# Patient Record
Sex: MALE | Race: WHITE | NOT HISPANIC OR LATINO | Employment: OTHER | URBAN - METROPOLITAN AREA
[De-identification: names, ages, dates, MRNs, and addresses within clinical notes are randomized per-mention and may not be internally consistent; named-entity substitution may affect disease eponyms.]

---

## 2020-02-20 ENCOUNTER — NEW PATIENT COMPREHENSIVE (OUTPATIENT)
Dept: URBAN - METROPOLITAN AREA CLINIC 32 | Facility: CLINIC | Age: 75
End: 2020-02-20

## 2020-02-20 DIAGNOSIS — H25.013: ICD-10-CM

## 2020-02-20 PROCEDURE — 92004 COMPRE OPH EXAM NEW PT 1/>: CPT

## 2020-02-20 ASSESSMENT — VISUAL ACUITY
OD_CC: J1+
OD_CC: 20/30-1
OS_CC: J1
OS_SC: J1
OS_CC: 20/60
OD_SC: J1
OS_PH: 20/30-1

## 2020-02-20 ASSESSMENT — TONOMETRY
OD_IOP_MMHG: 18
OS_IOP_MMHG: 15

## 2020-02-20 ASSESSMENT — KERATOMETRY
OD_AXISANGLE2_DEGREES: 6
OD_AXISANGLE_DEGREES: 96
OS_AXISANGLE_DEGREES: 83
OS_K1POWER_DIOPTERS: 46.25
OS_AXISANGLE2_DEGREES: 173
OD_K2POWER_DIOPTERS: 42.75
OD_K1POWER_DIOPTERS: 44.5
OS_K2POWER_DIOPTERS: 44.75

## 2020-03-05 ENCOUNTER — SURGICAL TESTING (OUTPATIENT)
Dept: URBAN - METROPOLITAN AREA CLINIC 32 | Facility: CLINIC | Age: 75
End: 2020-03-05

## 2020-03-05 DIAGNOSIS — H25.043: ICD-10-CM

## 2020-03-05 DIAGNOSIS — H25.012: ICD-10-CM

## 2020-03-05 PROCEDURE — 92025 CPTRIZED CORNEAL TOPOGRAPHY: CPT

## 2020-03-05 PROCEDURE — 92136TC INTERFEROMETRY - TECHNICAL COMPONENT

## 2020-03-05 PROCEDURE — 92012 INTRM OPH EXAM EST PATIENT: CPT

## 2020-03-05 PROCEDURE — 92134 CPTRZ OPH DX IMG PST SGM RTA: CPT

## 2020-03-05 PROCEDURE — 92286 ANT SGM IMG I&R SPECLR MIC: CPT

## 2020-03-05 ASSESSMENT — KERATOMETRY
OS_AXISANGLE2_DEGREES: 173
OS_K1POWER_DIOPTERS: 46.25
OD_K2POWER_DIOPTERS: 42.75
OS_AXISANGLE_DEGREES: 83
OS_K2POWER_DIOPTERS: 44.75
OD_K1POWER_DIOPTERS: 44.5
OD_AXISANGLE_DEGREES: 96
OD_AXISANGLE2_DEGREES: 6

## 2020-03-05 ASSESSMENT — VISUAL ACUITY
OD_SC: J1
OD_CC: J1+
OS_SC: J1
OD_CC: 20/30
OS_CC: 20/40
OS_CC: J1+

## 2020-03-11 ENCOUNTER — SURGERY/PROCEDURE (OUTPATIENT)
Dept: URBAN - METROPOLITAN AREA CLINIC 32 | Facility: CLINIC | Age: 75
End: 2020-03-11

## 2020-03-11 DIAGNOSIS — H25.012: ICD-10-CM

## 2020-03-11 PROCEDURE — 66984AV REMOVE CATARACT, INSERT ADVANCED LENS

## 2020-03-12 ENCOUNTER — CATARACT POST-OP 1-DAY (OUTPATIENT)
Dept: URBAN - METROPOLITAN AREA CLINIC 32 | Facility: CLINIC | Age: 75
End: 2020-03-12

## 2020-03-12 DIAGNOSIS — Z96.1: ICD-10-CM

## 2020-03-12 DIAGNOSIS — H25.012: ICD-10-CM

## 2020-03-12 PROCEDURE — 99024 POSTOP FOLLOW-UP VISIT: CPT

## 2020-03-12 ASSESSMENT — KERATOMETRY
OS_AXISANGLE2_DEGREES: 173
OS_K2POWER_DIOPTERS: 44.75
OD_K2POWER_DIOPTERS: 42.75
OD_K1POWER_DIOPTERS: 44.5
OD_AXISANGLE2_DEGREES: 6
OS_K1POWER_DIOPTERS: 46.25
OS_AXISANGLE_DEGREES: 83
OD_AXISANGLE_DEGREES: 96

## 2020-03-12 ASSESSMENT — VISUAL ACUITY: OS_SC: 20/30+2

## 2020-03-12 ASSESSMENT — TONOMETRY: OS_IOP_MMHG: 26

## 2020-03-17 ENCOUNTER — POST-OP CATARACT (OUTPATIENT)
Dept: URBAN - METROPOLITAN AREA CLINIC 32 | Facility: CLINIC | Age: 75
End: 2020-03-17

## 2020-03-17 DIAGNOSIS — H25.011: ICD-10-CM

## 2020-03-17 PROCEDURE — 92012 INTRM OPH EXAM EST PATIENT: CPT

## 2020-03-17 ASSESSMENT — KERATOMETRY
OD_AXISANGLE_DEGREES: 96
OS_AXISANGLE2_DEGREES: 173
OS_K2POWER_DIOPTERS: 44.75
OS_AXISANGLE2_DEGREES: 5
OD_AXISANGLE2_DEGREES: 6
OD_K1POWER_DIOPTERS: 44.5
OS_AXISANGLE_DEGREES: 83
OS_K1POWER_DIOPTERS: 46.25
OS_K1POWER_DIOPTERS: 43.75
OD_K2POWER_DIOPTERS: 42.75
OS_AXISANGLE_DEGREES: 95
OS_K2POWER_DIOPTERS: 42.5

## 2020-03-17 ASSESSMENT — TONOMETRY: OS_IOP_MMHG: 20

## 2020-03-17 ASSESSMENT — VISUAL ACUITY: OS_SC: 20/25

## 2020-03-18 ASSESSMENT — KERATOMETRY
OD_K1POWER_DIOPTERS: 44.5
OS_K1POWER_DIOPTERS: 46.25
OS_AXISANGLE2_DEGREES: 173
OD_AXISANGLE2_DEGREES: 6
OD_K2POWER_DIOPTERS: 42.75
OD_AXISANGLE_DEGREES: 96
OS_AXISANGLE_DEGREES: 83
OS_K2POWER_DIOPTERS: 44.75

## 2020-05-13 ENCOUNTER — SURGERY/PROCEDURE (OUTPATIENT)
Dept: URBAN - METROPOLITAN AREA CLINIC 32 | Facility: CLINIC | Age: 75
End: 2020-05-13

## 2020-05-13 DIAGNOSIS — H25.011: ICD-10-CM

## 2020-05-13 DIAGNOSIS — H25.041: ICD-10-CM

## 2020-05-13 PROCEDURE — 66984AV REMOVE CATARACT, INSERT ADVANCED LENS

## 2020-05-14 ENCOUNTER — 1 DAY POST-OP (OUTPATIENT)
Dept: URBAN - METROPOLITAN AREA CLINIC 32 | Facility: CLINIC | Age: 75
End: 2020-05-14

## 2020-05-14 DIAGNOSIS — Z96.1: ICD-10-CM

## 2020-05-14 PROCEDURE — 99024 POSTOP FOLLOW-UP VISIT: CPT

## 2020-05-14 ASSESSMENT — KERATOMETRY
OS_AXISANGLE_DEGREES: 95
OD_AXISANGLE_DEGREES: 96
OD_AXISANGLE2_DEGREES: 6
OS_AXISANGLE2_DEGREES: 5
OS_K2POWER_DIOPTERS: 42.5
OS_AXISANGLE2_DEGREES: 173
OS_K2POWER_DIOPTERS: 44.75
OS_K1POWER_DIOPTERS: 43.75
OS_K1POWER_DIOPTERS: 46.25
OS_AXISANGLE_DEGREES: 83
OD_K2POWER_DIOPTERS: 42.75
OD_K1POWER_DIOPTERS: 44.5

## 2020-05-14 ASSESSMENT — VISUAL ACUITY
OD_SC: 20/60+1
OS_SC: 20/25

## 2020-05-14 ASSESSMENT — TONOMETRY: OD_IOP_MMHG: 14

## 2020-05-18 ASSESSMENT — KERATOMETRY
OS_K1POWER_DIOPTERS: 43.75
OD_AXISANGLE2_DEGREES: 6
OS_AXISANGLE2_DEGREES: 173
OS_K2POWER_DIOPTERS: 44.75
OD_K2POWER_DIOPTERS: 42.75
OS_AXISANGLE_DEGREES: 83
OS_K2POWER_DIOPTERS: 42.5
OS_K1POWER_DIOPTERS: 46.25
OD_K1POWER_DIOPTERS: 44.5
OS_AXISANGLE2_DEGREES: 5
OS_AXISANGLE_DEGREES: 95
OD_AXISANGLE_DEGREES: 96

## 2020-05-19 ENCOUNTER — POST-OP CATARACT (OUTPATIENT)
Dept: URBAN - METROPOLITAN AREA CLINIC 32 | Facility: CLINIC | Age: 75
End: 2020-05-19

## 2020-05-19 DIAGNOSIS — Z96.1: ICD-10-CM

## 2020-05-19 DIAGNOSIS — H25.012: ICD-10-CM

## 2020-05-19 PROCEDURE — 99024 POSTOP FOLLOW-UP VISIT: CPT

## 2020-05-19 ASSESSMENT — KERATOMETRY
OS_AXISANGLE_DEGREES: 83
OS_K2POWER_DIOPTERS: 42.5
OS_K1POWER_DIOPTERS: 43.75
OD_AXISANGLE2_DEGREES: 6
OD_K1POWER_DIOPTERS: 44.5
OD_K2POWER_DIOPTERS: 42.75
OD_AXISANGLE_DEGREES: 96
OS_AXISANGLE2_DEGREES: 173
OS_AXISANGLE_DEGREES: 95
OS_K1POWER_DIOPTERS: 46.25
OS_K2POWER_DIOPTERS: 44.75
OS_AXISANGLE2_DEGREES: 5

## 2020-05-19 ASSESSMENT — VISUAL ACUITY
OS_SC: 20/25
OD_SC: J2
OS_SC: J3
OD_SC: 20/40+1

## 2020-05-19 ASSESSMENT — TONOMETRY
OD_IOP_MMHG: 17
OS_IOP_MMHG: 17

## 2020-05-28 ENCOUNTER — POST-OP CATARACT (OUTPATIENT)
Dept: URBAN - METROPOLITAN AREA CLINIC 32 | Facility: CLINIC | Age: 75
End: 2020-05-28

## 2020-05-28 DIAGNOSIS — Z96.1: ICD-10-CM

## 2020-05-28 PROCEDURE — 99024 POSTOP FOLLOW-UP VISIT: CPT

## 2020-05-28 ASSESSMENT — KERATOMETRY
OS_AXISANGLE_DEGREES: 95
OS_AXISANGLE2_DEGREES: 173
OS_AXISANGLE_DEGREES: 83
OS_K2POWER_DIOPTERS: 44.25
OD_K1POWER_DIOPTERS: 42.75
OD_AXISANGLE2_DEGREES: 112
OS_AXISANGLE2_DEGREES: 85
OS_AXISANGLE2_DEGREES: 5
OD_K1POWER_DIOPTERS: 44.5
OS_K1POWER_DIOPTERS: 46.25
OD_AXISANGLE_DEGREES: 96
OS_K2POWER_DIOPTERS: 44.75
OD_K2POWER_DIOPTERS: 43.75
OD_AXISANGLE2_DEGREES: 6
OS_K1POWER_DIOPTERS: 43.75
OS_K2POWER_DIOPTERS: 42.5
OD_AXISANGLE_DEGREES: 22
OS_AXISANGLE_DEGREES: 175
OS_K1POWER_DIOPTERS: 42.75
OD_K2POWER_DIOPTERS: 42.75

## 2020-05-28 ASSESSMENT — VISUAL ACUITY
OD_SC: 20/30
OS_SC: 20/25

## 2020-05-28 ASSESSMENT — TONOMETRY
OD_IOP_MMHG: 15
OS_IOP_MMHG: 12

## 2021-01-21 ENCOUNTER — ESTABLISHED COMPREHENSIVE EXAM (OUTPATIENT)
Dept: URBAN - METROPOLITAN AREA CLINIC 32 | Facility: CLINIC | Age: 76
End: 2021-01-21

## 2021-01-21 DIAGNOSIS — H26.491: ICD-10-CM

## 2021-01-21 DIAGNOSIS — H43.813: ICD-10-CM

## 2021-01-21 PROCEDURE — 92134 CPTRZ OPH DX IMG PST SGM RTA: CPT

## 2021-01-21 PROCEDURE — 92014 COMPRE OPH EXAM EST PT 1/>: CPT

## 2021-01-21 ASSESSMENT — KERATOMETRY
OS_AXISANGLE2_DEGREES: 175
OD_AXISANGLE2_DEGREES: 8
OS_AXISANGLE_DEGREES: 85
OS_K2POWER_DIOPTERS: 43.00
OS_K1POWER_DIOPTERS: 45.00
OD_AXISANGLE_DEGREES: 98
OD_K1POWER_DIOPTERS: 44.25
OD_K2POWER_DIOPTERS: 42.50

## 2021-01-21 ASSESSMENT — TONOMETRY
OD_IOP_MMHG: 13
OS_IOP_MMHG: 13

## 2021-01-21 ASSESSMENT — VISUAL ACUITY
OD_SC: 20/30-1
OS_SC: 20/25-1
OD_SC: J2-1
OS_SC: J2-2

## 2022-01-27 ENCOUNTER — COMPREHENSIVE EXAM (OUTPATIENT)
Dept: URBAN - METROPOLITAN AREA CLINIC 32 | Facility: CLINIC | Age: 77
End: 2022-01-27

## 2022-01-27 DIAGNOSIS — H26.491: ICD-10-CM

## 2022-01-27 PROCEDURE — 92014 COMPRE OPH EXAM EST PT 1/>: CPT

## 2022-01-27 ASSESSMENT — KERATOMETRY
OS_AXISANGLE_DEGREES: 85
OS_K1POWER_DIOPTERS: 45.00
OS_K2POWER_DIOPTERS: 43.00
OS_AXISANGLE2_DEGREES: 175
OD_AXISANGLE2_DEGREES: 8
OD_AXISANGLE_DEGREES: 98
OD_K1POWER_DIOPTERS: 44.25
OD_K2POWER_DIOPTERS: 42.50

## 2022-01-27 ASSESSMENT — VISUAL ACUITY
OD_SC: 20/30-1
OS_SC: 20/20-2
OD_SC: J3
OS_SC: J3

## 2022-01-27 ASSESSMENT — TONOMETRY
OS_IOP_MMHG: 15
OD_IOP_MMHG: 14

## 2023-01-30 ENCOUNTER — COMPREHENSIVE EXAM (OUTPATIENT)
Dept: URBAN - METROPOLITAN AREA CLINIC 32 | Facility: CLINIC | Age: 78
End: 2023-01-30

## 2023-01-30 DIAGNOSIS — Z96.1: ICD-10-CM

## 2023-01-30 DIAGNOSIS — H43.813: ICD-10-CM

## 2023-01-30 DIAGNOSIS — H26.493: ICD-10-CM

## 2023-01-30 DIAGNOSIS — H25.012: ICD-10-CM

## 2023-01-30 DIAGNOSIS — H35.373: ICD-10-CM

## 2023-01-30 PROCEDURE — 92134 CPTRZ OPH DX IMG PST SGM RTA: CPT

## 2023-01-30 PROCEDURE — 92015 DETERMINE REFRACTIVE STATE: CPT

## 2023-01-30 PROCEDURE — 92014 COMPRE OPH EXAM EST PT 1/>: CPT

## 2023-01-30 ASSESSMENT — TONOMETRY
OD_IOP_MMHG: 13
OS_IOP_MMHG: 12

## 2023-01-30 ASSESSMENT — KERATOMETRY
OD_AXISANGLE2_DEGREES: 13
OD_K1POWER_DIOPTERS: 43.75
OD_AXISANGLE_DEGREES: 103
OD_K2POWER_DIOPTERS: 42.75
OS_AXISANGLE2_DEGREES: 179
OS_K1POWER_DIOPTERS: 44.00
OS_AXISANGLE_DEGREES: 89
OS_K2POWER_DIOPTERS: 42.75

## 2023-01-30 ASSESSMENT — VISUAL ACUITY
OS_SC: 20/20-2
OD_SC: J2
OS_SC: J2-1
OD_SC: 20/30+1

## 2023-08-25 ENCOUNTER — OFFICE VISIT (OUTPATIENT)
Dept: PRIMARY CARE | Facility: CLINIC | Age: 78
End: 2023-08-25
Payer: MEDICARE

## 2023-08-25 VITALS
BODY MASS INDEX: 27.78 KG/M2 | WEIGHT: 177 LBS | SYSTOLIC BLOOD PRESSURE: 136 MMHG | HEIGHT: 67 IN | DIASTOLIC BLOOD PRESSURE: 76 MMHG

## 2023-08-25 DIAGNOSIS — E78.00 PURE HYPERCHOLESTEROLEMIA: ICD-10-CM

## 2023-08-25 DIAGNOSIS — M10.9 GOUT, UNSPECIFIED CAUSE, UNSPECIFIED CHRONICITY, UNSPECIFIED SITE: ICD-10-CM

## 2023-08-25 DIAGNOSIS — N41.9 PROSTATITIS, UNSPECIFIED PROSTATITIS TYPE: ICD-10-CM

## 2023-08-25 DIAGNOSIS — N40.0 ENLARGED PROSTATE: ICD-10-CM

## 2023-08-25 DIAGNOSIS — I10 PRIMARY HYPERTENSION: ICD-10-CM

## 2023-08-25 PROBLEM — N28.1 ACQUIRED CYSTIC KIDNEY DISEASE: Status: ACTIVE | Noted: 2023-08-25

## 2023-08-25 PROBLEM — H43.813 POSTERIOR VITREOUS DETACHMENT OF BOTH EYES: Status: ACTIVE | Noted: 2023-08-25

## 2023-08-25 PROBLEM — H25.813 MIXED TYPE AGE-RELATED CATARACT, BOTH EYES: Status: ACTIVE | Noted: 2023-08-25

## 2023-08-25 PROBLEM — F41.9 ANXIETY: Status: ACTIVE | Noted: 2023-08-25

## 2023-08-25 PROBLEM — E53.8 VITAMIN B12 DEFICIENCY: Status: ACTIVE | Noted: 2023-08-25

## 2023-08-25 PROBLEM — M75.122 COMPLETE TEAR OF LEFT ROTATOR CUFF: Status: ACTIVE | Noted: 2023-08-25

## 2023-08-25 PROBLEM — R53.83 FATIGUE: Status: ACTIVE | Noted: 2023-08-25

## 2023-08-25 PROBLEM — R10.9 ABDOMINAL PAIN: Status: ACTIVE | Noted: 2023-08-25

## 2023-08-25 PROBLEM — M25.562 ACUTE PAIN OF LEFT KNEE: Status: ACTIVE | Noted: 2023-08-25

## 2023-08-25 PROBLEM — M25.559 PAIN IN JOINT INVOLVING PELVIC REGION AND THIGH: Status: ACTIVE | Noted: 2023-08-25

## 2023-08-25 PROBLEM — H26.9 CATARACT: Status: ACTIVE | Noted: 2023-08-25

## 2023-08-25 PROBLEM — M54.16 LUMBAR RADICULOPATHY: Status: ACTIVE | Noted: 2023-08-25

## 2023-08-25 PROBLEM — R73.03 BORDERLINE DIABETES: Status: ACTIVE | Noted: 2023-08-25

## 2023-08-25 PROBLEM — G43.109 OPHTHALMIC MIGRAINE: Status: ACTIVE | Noted: 2023-08-25

## 2023-08-25 PROBLEM — H43.399 VITREOUS FLOATERS: Status: ACTIVE | Noted: 2023-08-25

## 2023-08-25 PROBLEM — M19.039 OSTEOARTHRITIS OF WRIST: Status: ACTIVE | Noted: 2023-08-25

## 2023-08-25 PROBLEM — M18.0 ARTHRITIS OF CARPOMETACARPAL (CMC) JOINT OF BOTH THUMBS: Status: ACTIVE | Noted: 2023-08-25

## 2023-08-25 PROBLEM — H91.90 HARD OF HEARING: Status: ACTIVE | Noted: 2023-08-25

## 2023-08-25 PROBLEM — J32.9 SINUSITIS: Status: ACTIVE | Noted: 2023-08-25

## 2023-08-25 PROBLEM — R94.31 EKG, ABNORMAL: Status: ACTIVE | Noted: 2023-08-25

## 2023-08-25 PROBLEM — S63.602A LEFT THUMB SPRAIN: Status: ACTIVE | Noted: 2023-08-25

## 2023-08-25 PROBLEM — M54.50 LOW BACK PAIN: Status: ACTIVE | Noted: 2023-08-25

## 2023-08-25 PROBLEM — E55.9 VITAMIN D DEFICIENCY: Status: ACTIVE | Noted: 2023-08-25

## 2023-08-25 PROBLEM — M19.031 PRIMARY OSTEOARTHRITIS, RIGHT WRIST: Status: ACTIVE | Noted: 2023-08-25

## 2023-08-25 PROBLEM — R01.1 SYSTOLIC MURMUR: Status: ACTIVE | Noted: 2023-08-25

## 2023-08-25 PROBLEM — Z97.3 WEARS EYEGLASSES: Status: ACTIVE | Noted: 2023-08-25

## 2023-08-25 PROCEDURE — 1159F MED LIST DOCD IN RCRD: CPT | Performed by: INTERNAL MEDICINE

## 2023-08-25 PROCEDURE — 1125F AMNT PAIN NOTED PAIN PRSNT: CPT | Performed by: INTERNAL MEDICINE

## 2023-08-25 PROCEDURE — 3075F SYST BP GE 130 - 139MM HG: CPT | Performed by: INTERNAL MEDICINE

## 2023-08-25 PROCEDURE — 3078F DIAST BP <80 MM HG: CPT | Performed by: INTERNAL MEDICINE

## 2023-08-25 PROCEDURE — 99213 OFFICE O/P EST LOW 20 MIN: CPT | Performed by: INTERNAL MEDICINE

## 2023-08-25 RX ORDER — COLCHICINE 0.6 MG/1
0.6 TABLET ORAL DAILY
Qty: 90 TABLET | Refills: 0 | Status: SHIPPED | OUTPATIENT
Start: 2023-08-25 | End: 2023-12-25 | Stop reason: ALTCHOICE

## 2023-08-25 RX ORDER — NEBIVOLOL HYDROCHLORIDE 10 MG/1
TABLET ORAL
COMMUNITY
Start: 2013-06-28 | End: 2023-08-25 | Stop reason: SDUPTHER

## 2023-08-25 RX ORDER — ATORVASTATIN CALCIUM 10 MG/1
TABLET, FILM COATED ORAL
COMMUNITY
Start: 2013-06-28 | End: 2023-08-25 | Stop reason: SDUPTHER

## 2023-08-25 RX ORDER — ALLOPURINOL 300 MG/1
1 TABLET ORAL DAILY
COMMUNITY
Start: 2020-01-10 | End: 2023-08-25 | Stop reason: SDUPTHER

## 2023-08-25 RX ORDER — OLMESARTAN MEDOXOMIL AND HYDROCHLOROTHIAZIDE 40/25 40; 25 MG/1; MG/1
TABLET ORAL
COMMUNITY
Start: 2013-06-28 | End: 2023-08-25 | Stop reason: SDUPTHER

## 2023-08-25 RX ORDER — NEBIVOLOL HYDROCHLORIDE 10 MG/1
10 TABLET ORAL DAILY
Qty: 90 TABLET | Refills: 0 | Status: SHIPPED | OUTPATIENT
Start: 2023-08-25 | End: 2023-11-29 | Stop reason: SDUPTHER

## 2023-08-25 RX ORDER — OLMESARTAN MEDOXOMIL AND HYDROCHLOROTHIAZIDE 40/25 40; 25 MG/1; MG/1
1 TABLET ORAL DAILY
Qty: 90 TABLET | Refills: 0 | Status: SHIPPED | OUTPATIENT
Start: 2023-08-25 | End: 2023-11-29 | Stop reason: SDUPTHER

## 2023-08-25 RX ORDER — TAMSULOSIN HYDROCHLORIDE 0.4 MG/1
0.8 CAPSULE ORAL DAILY
COMMUNITY
End: 2023-08-25 | Stop reason: SDUPTHER

## 2023-08-25 RX ORDER — ATORVASTATIN CALCIUM 10 MG/1
10 TABLET, FILM COATED ORAL DAILY
Qty: 90 TABLET | Refills: 0 | Status: SHIPPED | OUTPATIENT
Start: 2023-08-25 | End: 2023-11-29 | Stop reason: SDUPTHER

## 2023-08-25 RX ORDER — ALLOPURINOL 300 MG/1
300 TABLET ORAL DAILY
Qty: 90 TABLET | Refills: 0 | Status: SHIPPED | OUTPATIENT
Start: 2023-08-25 | End: 2023-11-29 | Stop reason: SDUPTHER

## 2023-08-25 RX ORDER — TAMSULOSIN HYDROCHLORIDE 0.4 MG/1
0.8 CAPSULE ORAL DAILY
Qty: 180 CAPSULE | Refills: 0 | Status: SHIPPED | OUTPATIENT
Start: 2023-08-25 | End: 2023-11-29 | Stop reason: SDUPTHER

## 2023-08-25 ASSESSMENT — ENCOUNTER SYMPTOMS
OCCASIONAL FEELINGS OF UNSTEADINESS: 0
LOSS OF SENSATION IN FEET: 0
DEPRESSION: 0

## 2023-08-25 NOTE — PROGRESS NOTES
"OFFICE NOTE    NAME OF THE PATIENT: Geovany Stover    YOB: 1945    CHIEF COMPLAINT:  The patient today came here for follow-up on various conditions.  Overall, he is a happy person.  He is semi-retired.  Taking medications regularly with no side effects.  No chest pain. ______.    PAST MEDICAL HISTORY:  Reviewed on EMR, unchanged.    CURRENT MEDICATIONS:  Reviewed on EMR, unchanged.  List reviewed.    ALLERGIES:  Reviewed on EMR, unchanged.    SOCIAL HISTORY:  Reviewed on EMR, unchanged.    FAMILY HISTORY:  Reviewed on EMR, unchanged.    REVIEW OF SYSTEMS:  All 12 systems reviewed and pertaining covered in history and physical.    PHYSICAL EXAMINATION  VITAL SIGNS:  As recorded and reviewed from EMR.  RESPIRATORY:  The patient had normal inspirations and expirations.  The breath sounds were equal bilaterally and clear to auscultation.  CARDIOVASCULAR:  The patient had S1 normal, split S2 without obvious rubs, clicks, or murmurs.    GASTROINTESTINAL:  There was no hepatosplenomegaly.  There were no palpable masses and no inguinal nodes.  EXTREMITIES:  Legs had no edema.  NEUROLOGIC:  The patient had normal cranial nerves.  The reflexes, sensory, and motor examination were grossly within normal limits.    LAB WORK:  Laboratory testing discussed.    ASSESSMENT AND PLAN:  Hypertension, okay.  High cholesterol.  Monitor.  Prostate health.  Dr. Hatch handling it.  Gout.  I am going to check uric acid.  Complete blood work ordered.  I will see him in a week after test.  I also urged him to do Medicare Wellness Visit.      Kindly review this note in conjunction with EMR.     Subjective   Patient ID: Geovany Stover is a 77 y.o. male who presents for Follow-up.      HPI    Review of Systems    Objective   /76   Ht 1.702 m (5' 7\")   Wt 80.3 kg (177 lb)   BMI 27.72 kg/m²       Physical Exam    Assessment/Plan   Problem List Items Addressed This Visit       Enlarged prostate    Gout    Hypertension "    Prostatitis    Pure hypercholesterolemia

## 2023-11-18 ENCOUNTER — LAB (OUTPATIENT)
Dept: LAB | Facility: LAB | Age: 78
End: 2023-11-18
Payer: MEDICARE

## 2023-11-18 DIAGNOSIS — I10 PRIMARY HYPERTENSION: ICD-10-CM

## 2023-11-18 DIAGNOSIS — E78.00 PURE HYPERCHOLESTEROLEMIA: ICD-10-CM

## 2023-11-18 LAB
ALBUMIN SERPL BCP-MCNC: 4 G/DL (ref 3.4–5)
ALP SERPL-CCNC: 60 U/L (ref 33–136)
ALT SERPL W P-5'-P-CCNC: 14 U/L (ref 10–52)
ANION GAP SERPL CALC-SCNC: 12 MMOL/L (ref 10–20)
AST SERPL W P-5'-P-CCNC: 16 U/L (ref 9–39)
BASOPHILS # BLD AUTO: 0.04 X10*3/UL (ref 0–0.1)
BASOPHILS NFR BLD AUTO: 0.5 %
BILIRUB SERPL-MCNC: 0.7 MG/DL (ref 0–1.2)
BUN SERPL-MCNC: 32 MG/DL (ref 6–23)
CALCIUM SERPL-MCNC: 9.8 MG/DL (ref 8.6–10.6)
CHLORIDE SERPL-SCNC: 109 MMOL/L (ref 98–107)
CHOLEST SERPL-MCNC: 139 MG/DL (ref 0–199)
CHOLESTEROL/HDL RATIO: 3.6
CO2 SERPL-SCNC: 26 MMOL/L (ref 21–32)
CREAT SERPL-MCNC: 1.21 MG/DL (ref 0.5–1.3)
EOSINOPHIL # BLD AUTO: 0.12 X10*3/UL (ref 0–0.4)
EOSINOPHIL NFR BLD AUTO: 1.6 %
ERYTHROCYTE [DISTWIDTH] IN BLOOD BY AUTOMATED COUNT: 14.6 % (ref 11.5–14.5)
GFR SERPL CREATININE-BSD FRML MDRD: 62 ML/MIN/1.73M*2
GLUCOSE SERPL-MCNC: 114 MG/DL (ref 74–99)
HCT VFR BLD AUTO: 41.9 % (ref 41–52)
HDLC SERPL-MCNC: 39.1 MG/DL
HGB BLD-MCNC: 13.4 G/DL (ref 13.5–17.5)
IMM GRANULOCYTES # BLD AUTO: 0.05 X10*3/UL (ref 0–0.5)
IMM GRANULOCYTES NFR BLD AUTO: 0.7 % (ref 0–0.9)
LDLC SERPL CALC-MCNC: 73 MG/DL
LYMPHOCYTES # BLD AUTO: 1.11 X10*3/UL (ref 0.8–3)
LYMPHOCYTES NFR BLD AUTO: 15 %
MCH RBC QN AUTO: 30.2 PG (ref 26–34)
MCHC RBC AUTO-ENTMCNC: 32 G/DL (ref 32–36)
MCV RBC AUTO: 94 FL (ref 80–100)
MONOCYTES # BLD AUTO: 0.62 X10*3/UL (ref 0.05–0.8)
MONOCYTES NFR BLD AUTO: 8.4 %
NEUTROPHILS # BLD AUTO: 5.45 X10*3/UL (ref 1.6–5.5)
NEUTROPHILS NFR BLD AUTO: 73.8 %
NON HDL CHOLESTEROL: 100 MG/DL (ref 0–149)
NRBC BLD-RTO: 0 /100 WBCS (ref 0–0)
PLATELET # BLD AUTO: 230 X10*3/UL (ref 150–450)
POTASSIUM SERPL-SCNC: 3.9 MMOL/L (ref 3.5–5.3)
PROT SERPL-MCNC: 6.5 G/DL (ref 6.4–8.2)
RBC # BLD AUTO: 4.44 X10*6/UL (ref 4.5–5.9)
SODIUM SERPL-SCNC: 143 MMOL/L (ref 136–145)
TRIGL SERPL-MCNC: 135 MG/DL (ref 0–149)
TSH SERPL-ACNC: 1.78 MIU/L (ref 0.44–3.98)
VLDL: 27 MG/DL (ref 0–40)
WBC # BLD AUTO: 7.4 X10*3/UL (ref 4.4–11.3)

## 2023-11-18 PROCEDURE — 80061 LIPID PANEL: CPT

## 2023-11-18 PROCEDURE — 85025 COMPLETE CBC W/AUTO DIFF WBC: CPT

## 2023-11-18 PROCEDURE — 84443 ASSAY THYROID STIM HORMONE: CPT

## 2023-11-18 PROCEDURE — 80053 COMPREHEN METABOLIC PANEL: CPT

## 2023-11-18 PROCEDURE — 36415 COLL VENOUS BLD VENIPUNCTURE: CPT

## 2023-11-29 ENCOUNTER — OFFICE VISIT (OUTPATIENT)
Dept: PRIMARY CARE | Facility: CLINIC | Age: 78
End: 2023-11-29
Payer: MEDICARE

## 2023-11-29 ENCOUNTER — LAB (OUTPATIENT)
Dept: LAB | Facility: LAB | Age: 78
End: 2023-11-29
Payer: MEDICARE

## 2023-11-29 VITALS
BODY MASS INDEX: 28.88 KG/M2 | DIASTOLIC BLOOD PRESSURE: 72 MMHG | HEIGHT: 67 IN | WEIGHT: 184 LBS | SYSTOLIC BLOOD PRESSURE: 126 MMHG

## 2023-11-29 DIAGNOSIS — Z00.00 MEDICARE ANNUAL WELLNESS VISIT, INITIAL: Primary | ICD-10-CM

## 2023-11-29 DIAGNOSIS — N41.9 PROSTATITIS, UNSPECIFIED PROSTATITIS TYPE: ICD-10-CM

## 2023-11-29 DIAGNOSIS — I10 PRIMARY HYPERTENSION: ICD-10-CM

## 2023-11-29 DIAGNOSIS — Z13.29 THYROID DISORDER SCREENING: ICD-10-CM

## 2023-11-29 DIAGNOSIS — E78.00 PURE HYPERCHOLESTEROLEMIA: ICD-10-CM

## 2023-11-29 DIAGNOSIS — Z23 NEED FOR INFLUENZA VACCINATION: ICD-10-CM

## 2023-11-29 DIAGNOSIS — D64.9 ANEMIA, UNSPECIFIED TYPE: ICD-10-CM

## 2023-11-29 DIAGNOSIS — M10.9 GOUT, UNSPECIFIED CAUSE, UNSPECIFIED CHRONICITY, UNSPECIFIED SITE: ICD-10-CM

## 2023-11-29 LAB
BASOPHILS # BLD AUTO: 0.04 X10*3/UL (ref 0–0.1)
BASOPHILS NFR BLD AUTO: 0.6 %
EOSINOPHIL # BLD AUTO: 0.12 X10*3/UL (ref 0–0.4)
EOSINOPHIL NFR BLD AUTO: 1.7 %
ERYTHROCYTE [DISTWIDTH] IN BLOOD BY AUTOMATED COUNT: 14.4 % (ref 11.5–14.5)
HCT VFR BLD AUTO: 42 % (ref 41–52)
HGB BLD-MCNC: 13.8 G/DL (ref 13.5–17.5)
IMM GRANULOCYTES # BLD AUTO: 0.03 X10*3/UL (ref 0–0.5)
IMM GRANULOCYTES NFR BLD AUTO: 0.4 % (ref 0–0.9)
IRON SATN MFR SERPL: 21 % (ref 25–45)
IRON SERPL-MCNC: 72 UG/DL (ref 35–150)
LYMPHOCYTES # BLD AUTO: 0.86 X10*3/UL (ref 0.8–3)
LYMPHOCYTES NFR BLD AUTO: 12 %
MCH RBC QN AUTO: 30.3 PG (ref 26–34)
MCHC RBC AUTO-ENTMCNC: 32.9 G/DL (ref 32–36)
MCV RBC AUTO: 92 FL (ref 80–100)
MONOCYTES # BLD AUTO: 0.57 X10*3/UL (ref 0.05–0.8)
MONOCYTES NFR BLD AUTO: 8 %
NEUTROPHILS # BLD AUTO: 5.53 X10*3/UL (ref 1.6–5.5)
NEUTROPHILS NFR BLD AUTO: 77.3 %
NRBC BLD-RTO: 0 /100 WBCS (ref 0–0)
PLATELET # BLD AUTO: 204 X10*3/UL (ref 150–450)
RBC # BLD AUTO: 4.55 X10*6/UL (ref 4.5–5.9)
TIBC SERPL-MCNC: 336 UG/DL (ref 240–445)
UIBC SERPL-MCNC: 264 UG/DL (ref 110–370)
URATE SERPL-MCNC: 5.2 MG/DL (ref 4–7.5)
WBC # BLD AUTO: 7.2 X10*3/UL (ref 4.4–11.3)

## 2023-11-29 PROCEDURE — 3074F SYST BP LT 130 MM HG: CPT | Performed by: INTERNAL MEDICINE

## 2023-11-29 PROCEDURE — 1125F AMNT PAIN NOTED PAIN PRSNT: CPT | Performed by: INTERNAL MEDICINE

## 2023-11-29 PROCEDURE — 3078F DIAST BP <80 MM HG: CPT | Performed by: INTERNAL MEDICINE

## 2023-11-29 PROCEDURE — 85025 COMPLETE CBC W/AUTO DIFF WBC: CPT

## 2023-11-29 PROCEDURE — 90662 IIV NO PRSV INCREASED AG IM: CPT | Performed by: INTERNAL MEDICINE

## 2023-11-29 PROCEDURE — 99214 OFFICE O/P EST MOD 30 MIN: CPT | Performed by: INTERNAL MEDICINE

## 2023-11-29 PROCEDURE — 36415 COLL VENOUS BLD VENIPUNCTURE: CPT

## 2023-11-29 PROCEDURE — 83550 IRON BINDING TEST: CPT

## 2023-11-29 PROCEDURE — G0008 ADMIN INFLUENZA VIRUS VAC: HCPCS | Performed by: INTERNAL MEDICINE

## 2023-11-29 PROCEDURE — 83540 ASSAY OF IRON: CPT

## 2023-11-29 PROCEDURE — 1157F ADVNC CARE PLAN IN RCRD: CPT | Performed by: INTERNAL MEDICINE

## 2023-11-29 PROCEDURE — 84550 ASSAY OF BLOOD/URIC ACID: CPT

## 2023-11-29 PROCEDURE — 1160F RVW MEDS BY RX/DR IN RCRD: CPT | Performed by: INTERNAL MEDICINE

## 2023-11-29 PROCEDURE — 1170F FXNL STATUS ASSESSED: CPT | Performed by: INTERNAL MEDICINE

## 2023-11-29 PROCEDURE — 1159F MED LIST DOCD IN RCRD: CPT | Performed by: INTERNAL MEDICINE

## 2023-11-29 RX ORDER — OLMESARTAN MEDOXOMIL AND HYDROCHLOROTHIAZIDE 40/25 40; 25 MG/1; MG/1
1 TABLET ORAL DAILY
Qty: 90 TABLET | Refills: 1 | Status: SHIPPED | OUTPATIENT
Start: 2023-11-29 | End: 2024-05-27

## 2023-11-29 RX ORDER — TAMSULOSIN HYDROCHLORIDE 0.4 MG/1
0.8 CAPSULE ORAL DAILY
Qty: 180 CAPSULE | Refills: 1 | Status: SHIPPED | OUTPATIENT
Start: 2023-11-29

## 2023-11-29 RX ORDER — NEBIVOLOL HYDROCHLORIDE 10 MG/1
10 TABLET ORAL DAILY
Qty: 90 TABLET | Refills: 1 | Status: SHIPPED | OUTPATIENT
Start: 2023-11-29 | End: 2023-12-01

## 2023-11-29 RX ORDER — ALLOPURINOL 300 MG/1
300 TABLET ORAL DAILY
Qty: 90 TABLET | Refills: 1 | Status: SHIPPED | OUTPATIENT
Start: 2023-11-29

## 2023-11-29 RX ORDER — ATORVASTATIN CALCIUM 10 MG/1
10 TABLET, FILM COATED ORAL DAILY
Qty: 90 TABLET | Refills: 1 | Status: SHIPPED | OUTPATIENT
Start: 2023-11-29

## 2023-11-29 ASSESSMENT — PATIENT HEALTH QUESTIONNAIRE - PHQ9
2. FEELING DOWN, DEPRESSED OR HOPELESS: NOT AT ALL
SUM OF ALL RESPONSES TO PHQ9 QUESTIONS 1 AND 2: 0
1. LITTLE INTEREST OR PLEASURE IN DOING THINGS: NOT AT ALL

## 2023-11-29 ASSESSMENT — ACTIVITIES OF DAILY LIVING (ADL)
GROCERY_SHOPPING: INDEPENDENT
TAKING_MEDICATION: INDEPENDENT
DOING_HOUSEWORK: INDEPENDENT
MANAGING_FINANCES: INDEPENDENT
BATHING: INDEPENDENT
DRESSING: INDEPENDENT

## 2023-11-29 ASSESSMENT — ENCOUNTER SYMPTOMS
DEPRESSION: 0
LOSS OF SENSATION IN FEET: 0
OCCASIONAL FEELINGS OF UNSTEADINESS: 0

## 2023-11-29 NOTE — PROGRESS NOTES
"Subjective   Patient ID: Geovany Stover is a 78 y.o. male who presents for Follow-up (multiple medical issues.).    Mr. Stover today came here for multiple medical issues.  1. Medicare Wellness visit.  I thanked him for doing it for me.  2. Follow-up on blood work.  Appetite and weight are okay.  No problem.  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  He is going to the [place].    IMMUNIZATION:  The patient is getting flu shot today.  Regular with pneumonia and shingles shots.    HEALTH MAINTENANCE: Colonoscopy several years ago.  He is really not keen on it at the moment.    I have personally reviewed the patient's Past Medical History, Medications, Allergies, Social History, and Family History in the EMR.    Review of Systems   All other systems reviewed and are negative.  The patient never had a stroke.  No heart attack.  No diabetes.  No cancer.    Objective   /72   Ht 1.702 m (5' 7\")   Wt 83.5 kg (184 lb)   BMI 28.82 kg/m²     Physical Exam  Vitals reviewed.   HENT:      Right Ear: Tympanic membrane, ear canal and external ear normal.      Left Ear: Tympanic membrane, ear canal and external ear normal.   Eyes:      General: No scleral icterus.     Pupils: Pupils are equal, round, and reactive to light.   Neck:      Vascular: No carotid bruit.   Cardiovascular:      Heart sounds: Normal heart sounds, S1 normal and S2 normal. No murmur heard.     No friction rub.   Pulmonary:      Effort: Pulmonary effort is normal.      Breath sounds: Normal breath sounds and air entry.   Abdominal:      Palpations: There is no hepatomegaly, splenomegaly or mass.   Genitourinary:     Comments: RECTAL: Deferred by the patient.  GENITAL: Deferred by the patient.  Musculoskeletal:         General: No swelling or deformity. Normal range of motion.      Cervical back: Neck supple.      Right lower leg: No edema.      Left lower leg: No edema.   Lymphadenopathy:      Cervical: No cervical adenopathy.      Upper Body:      Right upper body: No " axillary adenopathy.      Left upper body: No axillary adenopathy.      Lower Body: No right inguinal adenopathy. No left inguinal adenopathy.   Neurological:      Mental Status: He is oriented to person, place, and time.      Cranial Nerves: Cranial nerves 2-12 are intact. No cranial nerve deficit.      Sensory: No sensory deficit.      Motor: Motor function is intact. No weakness.      Gait: Gait is intact.      Deep Tendon Reflexes: Reflexes normal.   Psychiatric:         Mood and Affect: Mood normal. Mood is not anxious or depressed. Affect is not angry.         Behavior: Behavior is not agitated.         Thought Content: Thought content normal.         Judgment: Judgment normal.     LAB WORK: Laboratory testing discussed.    Assessment/Plan   Problem List Items Addressed This Visit             ICD-10-CM       Cardiac and Vasculature    Hypertension I10    Pure hypercholesterolemia E78.00       Genitourinary and Reproductive    Prostatitis N41.9       Musculoskeletal and Injuries    Gout M10.9    Relevant Orders    Uric Acid     Other Visit Diagnoses         Codes    Medicare annual wellness visit, initial    -  Primary Z00.00    Need for influenza vaccination     Z23    Relevant Orders    Flu vaccine, quadrivalent, high-dose, preservative free, age 65y+ (FLUZONE) (Completed)    Anemia, unspecified type     D64.9    Relevant Orders    CBC and Auto Differential    Iron and TIBC    Thyroid disorder screening     Z13.29        1. Medicare Wellness visit, done.  2. Okay with vision and hearing.  No problem.  Regular with dental checkup.  3. The patient is full code.  In case of emergency, wife is first and Dewayne, his son, are legal power of .  The patient is not depressed, not suicidal.  4. Hypertension, okay.  5. Cholesterol, stable.  6. Thyroid, okay.  7. Anemia.  First time, I found his hemoglobin is borderline low.  I am going to order anemia checkup right away and I will follow up on it.  8. If  necessary, I will see him right away.  Otherwise, I will see him on a beautiful day of spring when he comes back from [place].  9. Flu shot on its way.    Melodyibvineet Attestation  By signing my name below, I, Kimani Terry attest that this documentation has been prepared under the direction and in the presence of Hao Meng MD.

## 2023-12-01 DIAGNOSIS — I10 PRIMARY HYPERTENSION: ICD-10-CM

## 2023-12-01 RX ORDER — NEBIVOLOL 10 MG/1
10 TABLET ORAL DAILY
Qty: 90 TABLET | Refills: 1 | Status: SHIPPED | OUTPATIENT
Start: 2023-12-01 | End: 2023-12-02 | Stop reason: SDUPTHER

## 2023-12-02 DIAGNOSIS — I10 PRIMARY HYPERTENSION: ICD-10-CM

## 2023-12-04 RX ORDER — NEBIVOLOL 10 MG/1
10 TABLET ORAL DAILY
Qty: 90 TABLET | Refills: 1 | Status: SHIPPED | OUTPATIENT
Start: 2023-12-04

## 2023-12-11 ENCOUNTER — OFFICE VISIT (OUTPATIENT)
Dept: ORTHOPEDIC SURGERY | Facility: CLINIC | Age: 78
End: 2023-12-11
Payer: MEDICARE

## 2023-12-11 DIAGNOSIS — M18.11 ARTHRITIS OF CARPOMETACARPAL (CMC) JOINT OF RIGHT THUMB: Primary | ICD-10-CM

## 2023-12-11 DIAGNOSIS — M18.12 ARTHRITIS OF CARPOMETACARPAL (CMC) JOINT OF LEFT THUMB: ICD-10-CM

## 2023-12-11 PROCEDURE — 20606 DRAIN/INJ JOINT/BURSA W/US: CPT | Performed by: ORTHOPAEDIC SURGERY

## 2023-12-11 PROCEDURE — 99213 OFFICE O/P EST LOW 20 MIN: CPT | Performed by: ORTHOPAEDIC SURGERY

## 2023-12-11 PROCEDURE — 1125F AMNT PAIN NOTED PAIN PRSNT: CPT | Performed by: ORTHOPAEDIC SURGERY

## 2023-12-11 PROCEDURE — 1159F MED LIST DOCD IN RCRD: CPT | Performed by: ORTHOPAEDIC SURGERY

## 2023-12-11 PROCEDURE — 1160F RVW MEDS BY RX/DR IN RCRD: CPT | Performed by: ORTHOPAEDIC SURGERY

## 2023-12-11 PROCEDURE — 1036F TOBACCO NON-USER: CPT | Performed by: ORTHOPAEDIC SURGERY

## 2023-12-11 RX ORDER — METHYLPREDNISOLONE ACETATE 40 MG/ML
20 INJECTION, SUSPENSION INTRA-ARTICULAR; INTRALESIONAL; INTRAMUSCULAR; SOFT TISSUE
Status: COMPLETED | OUTPATIENT
Start: 2023-12-11 | End: 2023-12-11

## 2023-12-11 RX ORDER — LIDOCAINE HYDROCHLORIDE 10 MG/ML
0.5 INJECTION INFILTRATION; PERINEURAL
Status: COMPLETED | OUTPATIENT
Start: 2023-12-11 | End: 2023-12-11

## 2023-12-11 RX ADMIN — LIDOCAINE HYDROCHLORIDE 0.5 ML: 10 INJECTION INFILTRATION; PERINEURAL at 08:19

## 2023-12-11 RX ADMIN — METHYLPREDNISOLONE ACETATE 20 MG: 40 INJECTION, SUSPENSION INTRA-ARTICULAR; INTRALESIONAL; INTRAMUSCULAR; SOFT TISSUE at 08:19

## 2023-12-11 ASSESSMENT — PAIN SCALES - GENERAL: PAINLEVEL_OUTOF10: 2

## 2023-12-11 ASSESSMENT — ENCOUNTER SYMPTOMS
FEVER: 0
ARTHRALGIAS: 1
FATIGUE: 0
SHORTNESS OF BREATH: 0
WHEEZING: 0
CHILLS: 0

## 2023-12-11 ASSESSMENT — PAIN - FUNCTIONAL ASSESSMENT: PAIN_FUNCTIONAL_ASSESSMENT: 0-10

## 2023-12-11 NOTE — PROGRESS NOTES
Reason for Appointment  Chief Complaint   Patient presents with    Left Hand - Injections    Right Hand - Injections     History of Present Illness  Patient is a 78 y.o. male here today for follow-up evaluation of bilateral thumb pain almost one year s/p bilateral CMC injections. These injections provided good relief, but over the past month he has noticed some aching returning. Pain is worse with pinching and gripping. He does also have occasional numbness in the right hand. He is requesting repeat injections today. No other updates to PMH, allergies, or medications.     Past Medical History:   Diagnosis Date    Arthritis     Gout     High cholesterol     Hypertension     Personal history of other diseases of the nervous system and sense organs     History of cataract    Personal history of other endocrine, nutritional and metabolic disease 06/21/2013    History of hypercalcemia       History reviewed. No pertinent surgical history.    Medication Documentation Review Audit       Reviewed by Hao Meng MD (Physician) on 11/29/23 at 0958      Medication Order Taking? Sig Documenting Provider Last Dose Status   allopurinol (Zyloprim) 300 mg tablet [id number]  Take 1 tablet (300 mg) by mouth once daily. Hao Meng MD  Active   atorvastatin (Lipitor) 10 mg tablet [id number]  Take 1 tablet (10 mg) by mouth once daily. Hao Meng MD  Active   Bystolic 10 mg tablet [id number]  Take 1 tablet (10 mg) by mouth once daily. Hao Meng MD  Active   colchicine 0.6 mg tablet [id number]  Take 1 tablet (0.6 mg) by mouth once daily. Hao Meng MD  Active   olmesartan-hydrochlorothiazide (BENIcar HCT) 40-25 mg tablet [id number]  Take 1 tablet by mouth once daily. Hao Meng MD  Active   tamsulosin (Flomax) 0.4 mg 24 hr capsule [id number]  Take 2 capsules (0.8 mg) by mouth once daily. Hao Meng MD  Active                    No Known Allergies    Review of Systems   Constitutional:  Negative for  chills, fatigue and fever.   Respiratory:  Negative for shortness of breath and wheezing.    Cardiovascular:  Negative for chest pain and leg swelling.   Musculoskeletal:  Positive for arthralgias.   All other systems reviewed and are negative.    Exam   On exam of bilateral hands, there significant CMC arthritis in both thumbs with swelling, positive CMC grind test, MP joints are stable, there is distal arthritic change, good pulses, good sensation, no atrophy, negative Tinel's median nerves, no triggering    Assessment   Encounter Diagnoses   Name Primary?    Arthritis of carpometacarpal (CMC) joint of right thumb Yes    Arthritis of carpometacarpal (CMC) joint of left thumb      Plan   Today we discussed conservative treatment. At this point, the patient is experiencing pain at the base of the bilateral thumb that is consistent with classic CMC joint osteoarthritis on clinical examination and X-ray images. We will do one injection into the side: bilateral thumb CMC joint in hopes to calm their symptoms nicely. Patient will follow-up in 4 weeks, if needed. Pt understands the small risk of infection and warning signs including flare reaction.    Patient ID: Geovany Stover is a 78 y.o. male.    M Inj/Asp: R radiocarpal on 12/11/2023 8:19 AM  Indications: pain  Details: ultrasound-guided  Medications: 0.5 mL lidocaine 10 mg/mL (1 %); 20 mg methylPREDNISolone acetate 40 mg/mL  Outcome: tolerated well, no immediate complications    After discussing the risks and benefits of the procedure we proceeded with the injection. Using ultrasound guidance we obtained images of the thumb CMC joint and subsequently we sterilely injected a mixture of 20 mg of DepoMedrol and .5 cc of 1% lidocaine into the right CMC joint. Pt tolerated the procedure well without any adverse effects.    Procedure, treatment alternatives, risks and benefits explained, specific risks discussed. Consent was given by the patient. Immediately prior to  procedure a time out was called to verify the correct patient, procedure, equipment, support staff and site/side marked as required. Patient was prepped and draped in the usual sterile fashion.       M Inj/Asp: L radiocarpal on 12/11/2023 8:19 AM  Indications: pain  Details: ultrasound-guided  Medications: 0.5 mL lidocaine 10 mg/mL (1 %); 20 mg methylPREDNISolone acetate 40 mg/mL  Outcome: tolerated well, no immediate complications    After discussing the risks and benefits of the procedure we proceeded with the injection. Using ultrasound guidance we obtained images of the thumb CMC joint and subsequently we sterilely injected a mixture of 20 mg of DepoMedrol and .5 cc of 1% lidocaine into the left CMC joint. Pt tolerated the procedure well without any adverse effects.    Procedure, treatment alternatives, risks and benefits explained, specific risks discussed. Consent was given by the patient. Immediately prior to procedure a time out was called to verify the correct patient, procedure, equipment, support staff and site/side marked as required. Patient was prepped and draped in the usual sterile fashion.       I, Shanae Bonner, attest that this documentation has been prepared under the direction and in the presence of Tc Arvizu MD. By signing below, ITc MD, personally performed the services described in this documentation. All medical record entries made by the scribe were at my direction and in my presence. I have reviewed the chart and agree that the record reflects my personal performance and is accurate and complete. 12/11/23

## 2023-12-25 ENCOUNTER — TELEMEDICINE (OUTPATIENT)
Dept: PRIMARY CARE | Facility: CLINIC | Age: 78
End: 2023-12-25
Payer: MEDICARE

## 2023-12-25 DIAGNOSIS — U07.1 COVID-19: Primary | ICD-10-CM

## 2023-12-25 PROCEDURE — 99213 OFFICE O/P EST LOW 20 MIN: CPT | Performed by: NURSE PRACTITIONER

## 2023-12-25 RX ORDER — NIRMATRELVIR AND RITONAVIR 300-100 MG
3 KIT ORAL 2 TIMES DAILY
Qty: 30 TABLET | Refills: 0 | Status: SHIPPED | OUTPATIENT
Start: 2023-12-25 | End: 2024-05-09 | Stop reason: WASHOUT

## 2023-12-25 ASSESSMENT — ENCOUNTER SYMPTOMS
DIAPHORESIS: 0
FEVER: 0
NAUSEA: 0
CHILLS: 0
RHINORRHEA: 1
DIZZINESS: 0
VOMITING: 0
COUGH: 1
HEADACHES: 1
SORE THROAT: 1
LIGHT-HEADEDNESS: 0
ACTIVITY CHANGE: 0
WHEEZING: 0
DIARRHEA: 0
SHORTNESS OF BREATH: 0

## 2023-12-25 NOTE — PROGRESS NOTES
Subjective   Patient ID: Geovany Stover is a 78 y.o. male who presents for Covid 19.    Tested +:12/25/23  Sx  started: 12/23/23:  Sx include: sore throat, cough, runny nose, HA  Denies fever  Vaccine Status: yes   Previous infection: yes         Review of Systems   Constitutional:  Negative for activity change, chills, diaphoresis and fever.   HENT:  Positive for congestion, rhinorrhea and sore throat.    Respiratory:  Positive for cough. Negative for shortness of breath and wheezing.    Cardiovascular:  Negative for chest pain.   Gastrointestinal:  Negative for diarrhea, nausea and vomiting.   Neurological:  Positive for headaches (mild). Negative for dizziness and light-headedness.       Objective   There were no vitals taken for this visit.    Physical Exam  Constitutional:       General: He is not in acute distress.     Appearance: Normal appearance. He is not ill-appearing.      Comments: Pt location in [place] and consent obtained.   Telemedicine appropriate evaluation completed.  Unable to perform complete physical exam due to virtual visit, patient was visualized on interactive video.      Pulmonary:      Effort: Pulmonary effort is normal.   Neurological:      Mental Status: He is alert and oriented to person, place, and time.         Assessment/Plan   Diagnoses and all orders for this visit:  COVID-19  -     nirmatrelvir-ritonavir (Paxlovid) 300 mg (150 mg x 2)-100 mg tablet therapy pack; Take 3 tablets by mouth 2 times a day. TAKE AS DIRECTED (300mg nirmatrelvir/100mg ritonavir) TWICE DAILY  Advised to hold Tamsulosin and Atorvastatin while on Paxlovid: if symptoms are improved by tomorrow recommend not taking Paxlovid.   Prescribed Paxlovid, Use OTC Tylenol as needed for pain/fever and Mucinex or Robitussin as needed for cough/congestion  Follow CDC guidelines for quarantine and masking.   seek emergency care if having CP or difficulty breathing.  Follow up as needed

## 2023-12-28 ENCOUNTER — APPOINTMENT (OUTPATIENT)
Dept: CARDIOLOGY | Facility: CLINIC | Age: 78
End: 2023-12-28
Payer: MEDICARE

## 2024-01-31 ENCOUNTER — COMPREHENSIVE EXAM (OUTPATIENT)
Dept: URBAN - METROPOLITAN AREA CLINIC 32 | Facility: CLINIC | Age: 79
End: 2024-01-31

## 2024-01-31 DIAGNOSIS — H43.813: ICD-10-CM

## 2024-01-31 DIAGNOSIS — H26.493: ICD-10-CM

## 2024-01-31 DIAGNOSIS — H35.373: ICD-10-CM

## 2024-01-31 PROCEDURE — 99214 OFFICE O/P EST MOD 30 MIN: CPT

## 2024-01-31 PROCEDURE — 92015 DETERMINE REFRACTIVE STATE: CPT

## 2024-01-31 PROCEDURE — 92134 CPTRZ OPH DX IMG PST SGM RTA: CPT

## 2024-01-31 ASSESSMENT — KERATOMETRY
OS_K1POWER_DIOPTERS: 44.00
OD_AXISANGLE_DEGREES: 103
OD_K1POWER_DIOPTERS: 43.75
OS_AXISANGLE2_DEGREES: 179
OS_AXISANGLE_DEGREES: 89
OD_K2POWER_DIOPTERS: 42.75
OS_K2POWER_DIOPTERS: 42.75
OD_AXISANGLE2_DEGREES: 13

## 2024-01-31 ASSESSMENT — TONOMETRY
OD_IOP_MMHG: 13
OS_IOP_MMHG: 14

## 2024-01-31 ASSESSMENT — VISUAL ACUITY
OS_SC: 20/30+2
OD_GLARE: 20/50
OD_SC: J2
OD_SC: 20/40
OS_GLARE: 20/40
OS_SC: J2

## 2024-05-09 ENCOUNTER — OFFICE VISIT (OUTPATIENT)
Dept: CARDIOLOGY | Facility: CLINIC | Age: 79
End: 2024-05-09
Payer: MEDICARE

## 2024-05-09 VITALS
BODY MASS INDEX: 29.74 KG/M2 | SYSTOLIC BLOOD PRESSURE: 131 MMHG | HEIGHT: 67 IN | WEIGHT: 189.5 LBS | RESPIRATION RATE: 16 BRPM | DIASTOLIC BLOOD PRESSURE: 76 MMHG | OXYGEN SATURATION: 97 % | HEART RATE: 66 BPM

## 2024-05-09 DIAGNOSIS — I10 HYPERTENSION, UNSPECIFIED TYPE: Primary | ICD-10-CM

## 2024-05-09 PROCEDURE — 3075F SYST BP GE 130 - 139MM HG: CPT | Performed by: NURSE PRACTITIONER

## 2024-05-09 PROCEDURE — 1159F MED LIST DOCD IN RCRD: CPT | Performed by: NURSE PRACTITIONER

## 2024-05-09 PROCEDURE — 99214 OFFICE O/P EST MOD 30 MIN: CPT | Performed by: NURSE PRACTITIONER

## 2024-05-09 PROCEDURE — 1036F TOBACCO NON-USER: CPT | Performed by: NURSE PRACTITIONER

## 2024-05-09 PROCEDURE — 1157F ADVNC CARE PLAN IN RCRD: CPT | Performed by: NURSE PRACTITIONER

## 2024-05-09 PROCEDURE — 1160F RVW MEDS BY RX/DR IN RCRD: CPT | Performed by: NURSE PRACTITIONER

## 2024-05-09 PROCEDURE — 3078F DIAST BP <80 MM HG: CPT | Performed by: NURSE PRACTITIONER

## 2024-05-09 RX ORDER — SILDENAFIL 100 MG/1
100 TABLET, FILM COATED ORAL
COMMUNITY
Start: 2024-01-05 | End: 2025-01-04

## 2024-05-09 RX ORDER — FINASTERIDE 5 MG/1
5 TABLET, FILM COATED ORAL DAILY
COMMUNITY
Start: 2024-01-05

## 2024-05-09 ASSESSMENT — ENCOUNTER SYMPTOMS
GASTROINTESTINAL NEGATIVE: 1
DEPRESSION: 0
RESPIRATORY NEGATIVE: 1
CARDIOVASCULAR NEGATIVE: 1
NEUROLOGICAL NEGATIVE: 1
MUSCULOSKELETAL NEGATIVE: 1
CONSTITUTIONAL NEGATIVE: 1

## 2024-05-09 ASSESSMENT — PATIENT HEALTH QUESTIONNAIRE - PHQ9
1. LITTLE INTEREST OR PLEASURE IN DOING THINGS: NOT AT ALL
SUM OF ALL RESPONSES TO PHQ9 QUESTIONS 1 AND 2: 0
2. FEELING DOWN, DEPRESSED OR HOPELESS: NOT AT ALL

## 2024-05-09 NOTE — PROGRESS NOTES
"Chief Complaint:   Follow-up (Annual FUV)    History Of Present Illness:    .Mr Stover returns in follow up.  Denies chest pain, sob, palpitations or pedal edema.           Last Recorded Vitals:  Blood pressure 131/76, pulse 66, resp. rate 16, height 1.702 m (5' 7\"), weight 86 kg (189 lb 8 oz), SpO2 97%.     Past Medical History:  Past Medical History:   Diagnosis Date    Arthritis     Gout     High cholesterol     Hypertension     Personal history of other diseases of the nervous system and sense organs     History of cataract    Personal history of other endocrine, nutritional and metabolic disease 06/21/2013    History of hypercalcemia        Past Surgical History:  No past surgical history on file.    Social History:  Social History     Socioeconomic History    Marital status:      Spouse name: None    Number of children: 2    Years of education: None    Highest education level: None   Occupational History    Occupation: Retired businessman, but still works.   Tobacco Use    Smoking status: Former     Types: Cigarettes    Smokeless tobacco: Never   Substance and Sexual Activity    Alcohol use: Never    Drug use: Defer    Sexual activity: Defer   Other Topics Concern    None   Social History Narrative    None     Social Determinants of Health     Financial Resource Strain: Not on file   Food Insecurity: Not on file   Transportation Needs: Not on file   Physical Activity: Not on file   Stress: Not on file   Social Connections: Not on file   Intimate Partner Violence: Not on file   Housing Stability: Not on file       Family History:  Family History   Problem Relation Name Age of Onset    Cancer Mother           Allergies:  Patient has no known allergies.    Outpatient Medications:  Current Outpatient Medications   Medication Sig Dispense Refill    allopurinol (Zyloprim) 300 mg tablet Take 1 tablet (300 mg) by mouth once daily. 90 tablet 1    atorvastatin (Lipitor) 10 mg tablet Take 1 tablet (10 mg) by mouth " once daily. 90 tablet 1    finasteride (Proscar) 5 mg tablet Take 1 tablet (5 mg) by mouth once daily.      nebivolol (Bystolic) 10 mg tablet Take 1 tablet (10 mg) by mouth once daily. 90 tablet 1    olmesartan-hydrochlorothiazide (BENIcar HCT) 40-25 mg tablet Take 1 tablet by mouth once daily. 90 tablet 1    sildenafil (Viagra) 100 mg tablet Take 1 tablet (100 mg) by mouth.      tamsulosin (Flomax) 0.4 mg 24 hr capsule Take 2 capsules (0.8 mg) by mouth once daily. 180 capsule 1     No current facility-administered medications for this visit.        Physical Exam:  Cardiovascular:      PMI at left midclavicular line. Normal rate. Regular rhythm. Normal S1. Normal S2.       Murmurs: There is no murmur.      No gallop.  No click. No rub.   Pulses:     Intact distal pulses.   Edema:     Peripheral edema absent.         ROS:  Review of Systems   Constitutional: Negative.   Cardiovascular: Negative.    Respiratory: Negative.     Skin: Negative.    Musculoskeletal: Negative.    Gastrointestinal: Negative.    Genitourinary: Negative.    Neurological: Negative.           Last Labs:  CBC -  Lab Results   Component Value Date    WBC 7.2 11/29/2023    HGB 13.8 11/29/2023    HCT 42.0 11/29/2023    MCV 92 11/29/2023     11/29/2023       CMP -  Lab Results   Component Value Date    CALCIUM 9.8 11/18/2023    PROT 6.5 11/18/2023    ALBUMIN 4.0 11/18/2023    AST 16 11/18/2023    ALT 14 11/18/2023    ALKPHOS 60 11/18/2023    BILITOT 0.7 11/18/2023       LIPID PANEL -   Lab Results   Component Value Date    CHOL 139 11/18/2023    TRIG 135 11/18/2023    HDL 39.1 11/18/2023    CHHDL 3.6 11/18/2023    LDLF 48 08/08/2022    VLDL 27 11/18/2023    NHDL 100 11/18/2023       RENAL FUNCTION PANEL -   Lab Results   Component Value Date    GLUCOSE 114 (H) 11/18/2023     11/18/2023    K 3.9 11/18/2023     (H) 11/18/2023    CO2 26 11/18/2023    ANIONGAP 12 11/18/2023    BUN 32 (H) 11/18/2023    CREATININE 1.21 11/18/2023     GFRMALE 76 08/08/2022    CALCIUM 9.8 11/18/2023    ALBUMIN 4.0 11/18/2023        Lab Results   Component Value Date    HGBA1C 5.5 06/19/2019         Assessment/Plan   Problem List Items Addressed This Visit    None      1. CAD. Patient had coronary artery calcium score of 38.94 when done December 2021. Had an echo showing an EF of 65%.     2. Hyperlipidemia. Continue atorvastatin 10 mg daily.     3. Hypertension. Continue current medications.     4. BPH     5. Gout        Esthela Meadows, APRN-CNP

## 2024-07-18 DIAGNOSIS — I10 PRIMARY HYPERTENSION: ICD-10-CM

## 2024-07-18 RX ORDER — OLMESARTAN MEDOXOMIL AND HYDROCHLOROTHIAZIDE 40/25 40; 25 MG/1; MG/1
1 TABLET ORAL DAILY
Qty: 30 TABLET | Refills: 0 | Status: SHIPPED | OUTPATIENT
Start: 2024-07-18

## 2024-07-18 RX ORDER — NEBIVOLOL 10 MG/1
10 TABLET ORAL DAILY
Qty: 30 TABLET | Refills: 0 | Status: SHIPPED | OUTPATIENT
Start: 2024-07-18

## 2024-08-08 DIAGNOSIS — M10.9 GOUT, UNSPECIFIED CAUSE, UNSPECIFIED CHRONICITY, UNSPECIFIED SITE: ICD-10-CM

## 2024-08-08 DIAGNOSIS — D64.9 ANEMIA, UNSPECIFIED TYPE: ICD-10-CM

## 2024-08-14 ENCOUNTER — APPOINTMENT (OUTPATIENT)
Dept: PRIMARY CARE | Facility: CLINIC | Age: 79
End: 2024-08-14
Payer: MEDICARE

## 2024-08-23 ENCOUNTER — APPOINTMENT (OUTPATIENT)
Dept: PRIMARY CARE | Facility: CLINIC | Age: 79
End: 2024-08-23
Payer: MEDICARE

## 2024-08-29 ENCOUNTER — LAB (OUTPATIENT)
Dept: LAB | Facility: LAB | Age: 79
End: 2024-08-29
Payer: MEDICARE

## 2024-08-29 DIAGNOSIS — D64.9 ANEMIA, UNSPECIFIED TYPE: ICD-10-CM

## 2024-08-29 DIAGNOSIS — M10.9 GOUT, UNSPECIFIED CAUSE, UNSPECIFIED CHRONICITY, UNSPECIFIED SITE: ICD-10-CM

## 2024-08-29 LAB
BASOPHILS # BLD AUTO: 0.04 X10*3/UL (ref 0–0.1)
BASOPHILS NFR BLD AUTO: 0.4 %
EOSINOPHIL # BLD AUTO: 0.11 X10*3/UL (ref 0–0.4)
EOSINOPHIL NFR BLD AUTO: 1.1 %
ERYTHROCYTE [DISTWIDTH] IN BLOOD BY AUTOMATED COUNT: 13.9 % (ref 11.5–14.5)
HCT VFR BLD AUTO: 46.4 % (ref 41–52)
HGB BLD-MCNC: 14.5 G/DL (ref 13.5–17.5)
IMM GRANULOCYTES # BLD AUTO: 0.04 X10*3/UL (ref 0–0.5)
IMM GRANULOCYTES NFR BLD AUTO: 0.4 % (ref 0–0.9)
IRON SERPL-MCNC: 65 UG/DL (ref 35–150)
LYMPHOCYTES # BLD AUTO: 1.03 X10*3/UL (ref 0.8–3)
LYMPHOCYTES NFR BLD AUTO: 10.7 %
MCH RBC QN AUTO: 28.9 PG (ref 26–34)
MCHC RBC AUTO-ENTMCNC: 31.3 G/DL (ref 32–36)
MCV RBC AUTO: 92 FL (ref 80–100)
MONOCYTES # BLD AUTO: 0.74 X10*3/UL (ref 0.05–0.8)
MONOCYTES NFR BLD AUTO: 7.7 %
NEUTROPHILS # BLD AUTO: 7.64 X10*3/UL (ref 1.6–5.5)
NEUTROPHILS NFR BLD AUTO: 79.7 %
NRBC BLD-RTO: 0 /100 WBCS (ref 0–0)
PLATELET # BLD AUTO: 224 X10*3/UL (ref 150–450)
RBC # BLD AUTO: 5.02 X10*6/UL (ref 4.5–5.9)
URATE SERPL-MCNC: 8.8 MG/DL (ref 4–7.5)
VIT B12 SERPL-MCNC: 359 PG/ML (ref 211–911)
WBC # BLD AUTO: 9.6 X10*3/UL (ref 4.4–11.3)

## 2024-08-29 PROCEDURE — 85025 COMPLETE CBC W/AUTO DIFF WBC: CPT

## 2024-08-29 PROCEDURE — 83540 ASSAY OF IRON: CPT

## 2024-08-29 PROCEDURE — 84550 ASSAY OF BLOOD/URIC ACID: CPT

## 2024-08-29 PROCEDURE — 82607 VITAMIN B-12: CPT

## 2024-08-29 PROCEDURE — 36415 COLL VENOUS BLD VENIPUNCTURE: CPT

## 2024-08-30 ENCOUNTER — APPOINTMENT (OUTPATIENT)
Dept: PRIMARY CARE | Facility: CLINIC | Age: 79
End: 2024-08-30
Payer: MEDICARE

## 2024-08-30 VITALS
HEIGHT: 67 IN | WEIGHT: 182 LBS | BODY MASS INDEX: 28.56 KG/M2 | DIASTOLIC BLOOD PRESSURE: 70 MMHG | SYSTOLIC BLOOD PRESSURE: 142 MMHG

## 2024-08-30 DIAGNOSIS — N52.9 ERECTILE DISORDER: Primary | ICD-10-CM

## 2024-08-30 DIAGNOSIS — M10.9 GOUT, UNSPECIFIED CAUSE, UNSPECIFIED CHRONICITY, UNSPECIFIED SITE: ICD-10-CM

## 2024-08-30 DIAGNOSIS — I10 PRIMARY HYPERTENSION: ICD-10-CM

## 2024-08-30 DIAGNOSIS — R53.83 OTHER FATIGUE: ICD-10-CM

## 2024-08-30 DIAGNOSIS — N41.9 PROSTATITIS, UNSPECIFIED PROSTATITIS TYPE: ICD-10-CM

## 2024-08-30 DIAGNOSIS — E78.00 PURE HYPERCHOLESTEROLEMIA: ICD-10-CM

## 2024-08-30 PROCEDURE — 1157F ADVNC CARE PLAN IN RCRD: CPT | Performed by: INTERNAL MEDICINE

## 2024-08-30 PROCEDURE — 3078F DIAST BP <80 MM HG: CPT | Performed by: INTERNAL MEDICINE

## 2024-08-30 PROCEDURE — 99213 OFFICE O/P EST LOW 20 MIN: CPT | Performed by: INTERNAL MEDICINE

## 2024-08-30 PROCEDURE — 3077F SYST BP >= 140 MM HG: CPT | Performed by: INTERNAL MEDICINE

## 2024-08-30 RX ORDER — TAMSULOSIN HYDROCHLORIDE 0.4 MG/1
0.8 CAPSULE ORAL DAILY
Qty: 180 CAPSULE | Refills: 1 | Status: SHIPPED | OUTPATIENT
Start: 2024-08-30

## 2024-08-30 RX ORDER — SILDENAFIL 100 MG/1
100 TABLET, FILM COATED ORAL AS NEEDED
Qty: 10 TABLET | Refills: 0 | Status: SHIPPED | OUTPATIENT
Start: 2024-08-30 | End: 2025-08-30

## 2024-08-30 RX ORDER — ATORVASTATIN CALCIUM 10 MG/1
10 TABLET, FILM COATED ORAL DAILY
Qty: 90 TABLET | Refills: 1 | Status: SHIPPED | OUTPATIENT
Start: 2024-08-30

## 2024-08-30 RX ORDER — ALLOPURINOL 300 MG/1
300 TABLET ORAL DAILY
Qty: 90 TABLET | Refills: 1 | Status: SHIPPED | OUTPATIENT
Start: 2024-08-30

## 2024-08-30 RX ORDER — NEBIVOLOL 10 MG/1
10 TABLET ORAL DAILY
Qty: 30 TABLET | Refills: 0 | Status: SHIPPED | OUTPATIENT
Start: 2024-08-30

## 2024-08-30 RX ORDER — OLMESARTAN MEDOXOMIL AND HYDROCHLOROTHIAZIDE 40/25 40; 25 MG/1; MG/1
1 TABLET ORAL DAILY
Qty: 30 TABLET | Refills: 0 | Status: SHIPPED | OUTPATIENT
Start: 2024-08-30

## 2024-08-30 ASSESSMENT — ENCOUNTER SYMPTOMS
OCCASIONAL FEELINGS OF UNSTEADINESS: 0
DEPRESSION: 0
LOSS OF SENSATION IN FEET: 0

## 2024-08-31 NOTE — PROGRESS NOTES
"Subjective   Patient ID: Geovany Stover is a 78 y.o. male who presents for Follow-up (various conditions).    COMPLAINT:  Geovany Stover today came here for multiple medical issues.  Overall, he is a happy person.  Appetite and weight are okay.  No chest pain.  Taking medications regularly.  He is enjoying his [facility]FP.  He came here for follow-up on various conditions.    I have personally reviewed the patient's Past Medical History, Medications, Allergies, Social History, and Family History in the EMR.    Review of Systems   All other systems reviewed and are negative.    Objective   /70   Ht 1.702 m (5' 7\")   Wt 82.6 kg (182 lb)   BMI 28.51 kg/m²     Physical Exam  Vitals reviewed.   Cardiovascular:      Heart sounds: Normal heart sounds, S1 normal and S2 normal. No murmur heard.     No friction rub.   Pulmonary:      Effort: Pulmonary effort is normal.      Breath sounds: Normal breath sounds and air entry.   Abdominal:      Palpations: There is no hepatomegaly, splenomegaly or mass.   Musculoskeletal:      Right lower leg: No edema.      Left lower leg: No edema.   Lymphadenopathy:      Lower Body: No right inguinal adenopathy. No left inguinal adenopathy.   Neurological:      Cranial Nerves: Cranial nerves 2-12 are intact.      Sensory: No sensory deficit.      Motor: Motor function is intact.      Deep Tendon Reflexes: Reflexes are normal and symmetric.     LAB WORK:  Laboratory testing discussed.    Assessment/Plan   Problem List Items Addressed This Visit             ICD-10-CM       Cardiac and Vasculature    Hypertension I10    Relevant Medications    olmesartan-hydrochlorothiazide (BENIcar HCT) 40-25 mg tablet    nebivolol (Bystolic) 10 mg tablet    Other Relevant Orders    CBC    Urinalysis with Reflex Microscopic    Pure hypercholesterolemia E78.00    Relevant Medications    atorvastatin (Lipitor) 10 mg tablet    Other Relevant Orders    Comprehensive Metabolic Panel    Lipid Panel       " Genitourinary and Reproductive    Prostatitis N41.9    Relevant Medications    tamsulosin (Flomax) 0.4 mg 24 hr capsule       Musculoskeletal and Injuries    Gout M10.9    Relevant Medications    allopurinol (Zyloprim) 300 mg tablet    Other Relevant Orders    Uric Acid       Symptoms and Signs    Fatigue R53.83    Relevant Orders    Thyroid Stimulating Hormone     Other Visit Diagnoses         Codes    Erectile disorder    -  Primary N52.9    Relevant Medications    sildenafil (Viagra) 100 mg tablet        1. Gout.  He forgot to take his medication, numbers are ______.  2. Hypertension, okay.  3. High cholesterol, stable.  4. Thyroid.  Monitor.  Doing okay.  5. Prostate health.  He is looking for new urologist.  He will try Dr. Manjarrez.  Otherwise find an alternative.  6. Follow-up appointment with me in December.  Happy to serve him anytime sooner if necessary.    Scribe Attestation  By signing my name below, ICarolin, Kimani attest that this documentation has been prepared under the direction and in the presence of Hao Meng MD.

## 2024-09-26 DIAGNOSIS — I10 PRIMARY HYPERTENSION: ICD-10-CM

## 2024-09-26 RX ORDER — NEBIVOLOL 10 MG/1
10 TABLET ORAL DAILY
Qty: 30 TABLET | Refills: 0 | Status: SHIPPED | OUTPATIENT
Start: 2024-09-26

## 2024-09-26 RX ORDER — OLMESARTAN MEDOXOMIL AND HYDROCHLOROTHIAZIDE 40/25 40; 25 MG/1; MG/1
1 TABLET ORAL DAILY
Qty: 30 TABLET | Refills: 0 | Status: SHIPPED | OUTPATIENT
Start: 2024-09-26

## 2024-11-12 DIAGNOSIS — I10 PRIMARY HYPERTENSION: ICD-10-CM

## 2024-11-12 RX ORDER — OLMESARTAN MEDOXOMIL AND HYDROCHLOROTHIAZIDE 40/25 40; 25 MG/1; MG/1
1 TABLET ORAL DAILY
Qty: 90 TABLET | Refills: 0 | Status: SHIPPED | OUTPATIENT
Start: 2024-11-12

## 2024-11-12 RX ORDER — NEBIVOLOL 10 MG/1
10 TABLET ORAL DAILY
Qty: 90 TABLET | Refills: 0 | Status: SHIPPED | OUTPATIENT
Start: 2024-11-12

## 2024-11-14 ENCOUNTER — APPOINTMENT (OUTPATIENT)
Dept: CARDIOLOGY | Facility: CLINIC | Age: 79
End: 2024-11-14
Payer: MEDICARE

## 2024-11-14 VITALS
DIASTOLIC BLOOD PRESSURE: 66 MMHG | SYSTOLIC BLOOD PRESSURE: 136 MMHG | HEIGHT: 67 IN | OXYGEN SATURATION: 99 % | RESPIRATION RATE: 18 BRPM | HEART RATE: 58 BPM | WEIGHT: 185 LBS | BODY MASS INDEX: 29.03 KG/M2

## 2024-11-14 DIAGNOSIS — E78.00 PURE HYPERCHOLESTEROLEMIA: ICD-10-CM

## 2024-11-14 PROCEDURE — 3075F SYST BP GE 130 - 139MM HG: CPT | Performed by: INTERNAL MEDICINE

## 2024-11-14 PROCEDURE — 99214 OFFICE O/P EST MOD 30 MIN: CPT | Performed by: INTERNAL MEDICINE

## 2024-11-14 PROCEDURE — 3078F DIAST BP <80 MM HG: CPT | Performed by: INTERNAL MEDICINE

## 2024-11-14 PROCEDURE — 1157F ADVNC CARE PLAN IN RCRD: CPT | Performed by: INTERNAL MEDICINE

## 2024-11-14 RX ORDER — ATORVASTATIN CALCIUM 20 MG/1
20 TABLET, FILM COATED ORAL DAILY
Qty: 90 TABLET | Refills: 3 | Status: SHIPPED | OUTPATIENT
Start: 2024-11-14 | End: 2025-11-14

## 2024-11-14 ASSESSMENT — ENCOUNTER SYMPTOMS
NEUROLOGICAL NEGATIVE: 1
GASTROINTESTINAL NEGATIVE: 1
CONSTITUTIONAL NEGATIVE: 1
CARDIOVASCULAR NEGATIVE: 1
MUSCULOSKELETAL NEGATIVE: 1
RESPIRATORY NEGATIVE: 1

## 2024-11-14 ASSESSMENT — PATIENT HEALTH QUESTIONNAIRE - PHQ9
2. FEELING DOWN, DEPRESSED OR HOPELESS: NOT AT ALL
1. LITTLE INTEREST OR PLEASURE IN DOING THINGS: NOT AT ALL
SUM OF ALL RESPONSES TO PHQ9 QUESTIONS 1 AND 2: 0

## 2024-11-14 NOTE — PROGRESS NOTES
"Chief Complaint:   Follow-up (6 mo)    History Of Present Illness:    .Mr Stover returns in follow up.  Denies chest pain, sob, palpitations or pedal edema.         Last Recorded Vitals:  Blood pressure 148/84, pulse 68, resp. rate 18, height 1.702 m (5' 7\"), weight 83.9 kg (185 lb), SpO2 99%.     Past Medical History:  Past Medical History:   Diagnosis Date    Arthritis     Gout     High cholesterol     Hypertension     Personal history of other diseases of the nervous system and sense organs     History of cataract    Personal history of other endocrine, nutritional and metabolic disease 06/21/2013    History of hypercalcemia        Past Surgical History:  No past surgical history on file.    Social History:  Social History     Socioeconomic History    Marital status:     Number of children: 2   Occupational History    Occupation: Retired businessman, but still works.   Tobacco Use    Smoking status: Former     Types: Cigarettes    Smokeless tobacco: Never   Substance and Sexual Activity    Alcohol use: Never    Drug use: Never    Sexual activity: Yes     Partners: Female       Family History:  Family History   Problem Relation Name Age of Onset    Cancer Mother Marguerite Stover     Heart attack Father Madhu Stover     Cancer Brother tammi Stover          Allergies:  Patient has no known allergies.    Outpatient Medications:  Current Outpatient Medications   Medication Sig Dispense Refill    allopurinol (Zyloprim) 300 mg tablet Take 1 tablet (300 mg) by mouth once daily. 90 tablet 1    atorvastatin (Lipitor) 10 mg tablet Take 1 tablet (10 mg) by mouth once daily. 90 tablet 1    finasteride (Proscar) 5 mg tablet Take 1 tablet (5 mg) by mouth once daily.      nebivolol (Bystolic) 10 mg tablet Take 1 tablet (10 mg) by mouth once daily. 90 tablet 0    olmesartan-hydrochlorothiazide (BENIcar HCT) 40-25 mg tablet Take 1 tablet by mouth once daily. 90 tablet 0    sildenafil (Viagra) 100 mg tablet Take 1 tablet (100 " mg) by mouth if needed for erectile dysfunction. 10 tablet 0    tamsulosin (Flomax) 0.4 mg 24 hr capsule Take 2 capsules (0.8 mg) by mouth once daily. 180 capsule 1     No current facility-administered medications for this visit.        Physical Exam:  Cardiovascular:      PMI at left midclavicular line. Normal rate. Regular rhythm. Normal S1. Normal S2.       Murmurs: There is no murmur.      No gallop.  No click. No rub.   Pulses:     Intact distal pulses.   Edema:     Peripheral edema absent.       ROS:  Review of Systems   Constitutional: Negative.   Cardiovascular: Negative.    Respiratory: Negative.     Skin: Negative.    Musculoskeletal: Negative.    Gastrointestinal: Negative.    Genitourinary: Negative.    Neurological: Negative.           Last Labs:  CBC -  Lab Results   Component Value Date    WBC 9.6 08/29/2024    HGB 14.5 08/29/2024    HCT 46.4 08/29/2024    MCV 92 08/29/2024     08/29/2024       CMP -  Lab Results   Component Value Date    CALCIUM 9.8 11/18/2023    PROT 6.5 11/18/2023    ALBUMIN 4.0 11/18/2023    AST 16 11/18/2023    ALT 14 11/18/2023    ALKPHOS 60 11/18/2023    BILITOT 0.7 11/18/2023       LIPID PANEL -   Lab Results   Component Value Date    CHOL 139 11/18/2023    TRIG 135 11/18/2023    HDL 39.1 11/18/2023    CHHDL 3.6 11/18/2023    LDLF 48 08/08/2022    VLDL 27 11/18/2023    NHDL 100 11/18/2023       RENAL FUNCTION PANEL -   Lab Results   Component Value Date    GLUCOSE 114 (H) 11/18/2023     11/18/2023    K 3.9 11/18/2023     (H) 11/18/2023    CO2 26 11/18/2023    ANIONGAP 12 11/18/2023    BUN 32 (H) 11/18/2023    CREATININE 1.21 11/18/2023    GFRMALE 76 08/08/2022    CALCIUM 9.8 11/18/2023    ALBUMIN 4.0 11/18/2023        Lab Results   Component Value Date    HGBA1C 5.5 06/19/2019         Assessment/Plan   Problem List Items Addressed This Visit    None  Assessment:    1. CAD. Patient had coronary artery calcium score of 38.94 when done December 2021. Had an  echo showing an EF of 65%.  Patient remains asymptomatic.     2. Hyperlipidemia.  Lab work 11/18/2023 includes cholesterol 139 LDL 73 HDL 39.  Will increase atorvastatin from 10 to 20 mg daily.     3. Hypertension. Continue current medications.  Lab work 8/29/2024 includes a normal CBC serum iron 65 B12 359 uric acid 8.8.     4. BPH.  PSA value was 2.60 on 1/5/2024.     5. Gout.  Recent uric acid elevated at 8.8 on 8/29/2024.  Patient now on allopurinol 300 mg daily.       Esthela Meadows, APRN-CNP

## 2025-01-08 ENCOUNTER — LAB (OUTPATIENT)
Dept: LAB | Facility: LAB | Age: 80
End: 2025-01-08
Payer: MEDICARE

## 2025-01-08 DIAGNOSIS — M10.9 GOUT, UNSPECIFIED CAUSE, UNSPECIFIED CHRONICITY, UNSPECIFIED SITE: ICD-10-CM

## 2025-01-08 DIAGNOSIS — R53.83 OTHER FATIGUE: ICD-10-CM

## 2025-01-08 DIAGNOSIS — I10 PRIMARY HYPERTENSION: ICD-10-CM

## 2025-01-08 DIAGNOSIS — E78.00 PURE HYPERCHOLESTEROLEMIA: ICD-10-CM

## 2025-01-08 LAB
ALBUMIN SERPL BCP-MCNC: 4.2 G/DL (ref 3.4–5)
ALP SERPL-CCNC: 65 U/L (ref 33–136)
ALT SERPL W P-5'-P-CCNC: 19 U/L (ref 10–52)
ANION GAP SERPL CALCULATED.3IONS-SCNC: 11 MMOL/L (ref 10–20)
AST SERPL W P-5'-P-CCNC: 24 U/L (ref 9–39)
BILIRUB SERPL-MCNC: 0.9 MG/DL (ref 0–1.2)
BUN SERPL-MCNC: 27 MG/DL (ref 6–23)
CALCIUM SERPL-MCNC: 9.6 MG/DL (ref 8.6–10.3)
CHLORIDE SERPL-SCNC: 105 MMOL/L (ref 98–107)
CHOLEST SERPL-MCNC: 136 MG/DL (ref 0–199)
CHOLEST/HDLC SERPL: 3 {RATIO}
CO2 SERPL-SCNC: 26 MMOL/L (ref 21–32)
CREAT SERPL-MCNC: 1.1 MG/DL (ref 0.5–1.3)
EGFRCR SERPLBLD CKD-EPI 2021: 69 ML/MIN/1.73M*2
ERYTHROCYTE [DISTWIDTH] IN BLOOD BY AUTOMATED COUNT: 14.6 % (ref 11.5–14.5)
GLUCOSE SERPL-MCNC: 110 MG/DL (ref 74–99)
HCT VFR BLD AUTO: 43.7 % (ref 41–52)
HDLC SERPL-MCNC: 45.5 MG/DL
HGB BLD-MCNC: 13.8 G/DL (ref 13.5–17.5)
LDLC SERPL CALC-MCNC: 68 MG/DL
MCH RBC QN AUTO: 29.6 PG (ref 26–34)
MCHC RBC AUTO-ENTMCNC: 31.6 G/DL (ref 32–36)
MCV RBC AUTO: 94 FL (ref 80–100)
NON HDL CHOLESTEROL: 91 MG/DL (ref 0–149)
NRBC BLD-RTO: 0 /100 WBCS (ref 0–0)
PLATELET # BLD AUTO: 225 X10*3/UL (ref 150–450)
POTASSIUM SERPL-SCNC: 3.7 MMOL/L (ref 3.5–5.3)
PROT SERPL-MCNC: 6.7 G/DL (ref 6.4–8.2)
RBC # BLD AUTO: 4.66 X10*6/UL (ref 4.5–5.9)
SODIUM SERPL-SCNC: 138 MMOL/L (ref 136–145)
TRIGL SERPL-MCNC: 111 MG/DL (ref 0–149)
TSH SERPL-ACNC: 1.17 MIU/L (ref 0.44–3.98)
URATE SERPL-MCNC: 5.5 MG/DL (ref 4–7.5)
VLDL: 22 MG/DL (ref 0–40)
WBC # BLD AUTO: 6.8 X10*3/UL (ref 4.4–11.3)

## 2025-01-08 PROCEDURE — 84550 ASSAY OF BLOOD/URIC ACID: CPT

## 2025-01-08 PROCEDURE — 85027 COMPLETE CBC AUTOMATED: CPT

## 2025-01-08 PROCEDURE — 80053 COMPREHEN METABOLIC PANEL: CPT

## 2025-01-08 PROCEDURE — 80061 LIPID PANEL: CPT

## 2025-01-08 PROCEDURE — 84443 ASSAY THYROID STIM HORMONE: CPT

## 2025-01-09 ENCOUNTER — APPOINTMENT (OUTPATIENT)
Dept: PRIMARY CARE | Facility: CLINIC | Age: 80
End: 2025-01-09
Payer: MEDICARE

## 2025-01-09 VITALS
DIASTOLIC BLOOD PRESSURE: 70 MMHG | HEIGHT: 67 IN | BODY MASS INDEX: 29.35 KG/M2 | SYSTOLIC BLOOD PRESSURE: 132 MMHG | WEIGHT: 187 LBS

## 2025-01-09 DIAGNOSIS — E78.00 PURE HYPERCHOLESTEROLEMIA: ICD-10-CM

## 2025-01-09 DIAGNOSIS — I10 PRIMARY HYPERTENSION: ICD-10-CM

## 2025-01-09 DIAGNOSIS — N40.0 BENIGN PROSTATIC HYPERPLASIA, UNSPECIFIED WHETHER LOWER URINARY TRACT SYMPTOMS PRESENT: Primary | ICD-10-CM

## 2025-01-09 DIAGNOSIS — M10.9 GOUT, UNSPECIFIED CAUSE, UNSPECIFIED CHRONICITY, UNSPECIFIED SITE: ICD-10-CM

## 2025-01-09 DIAGNOSIS — N41.9 PROSTATITIS, UNSPECIFIED PROSTATITIS TYPE: ICD-10-CM

## 2025-01-09 PROBLEM — H25.819 COMBINED FORMS OF AGE-RELATED CATARACT: Status: ACTIVE | Noted: 2025-01-09

## 2025-01-09 PROBLEM — Z86.39 HISTORY OF METABOLIC DISORDER: Status: ACTIVE | Noted: 2025-01-09

## 2025-01-09 PROBLEM — Z86.69 HISTORY OF CATARACT: Status: ACTIVE | Noted: 2025-01-09

## 2025-01-09 PROBLEM — H43.819 POSTERIOR VITREOUS DETACHMENT: Status: ACTIVE | Noted: 2025-01-09

## 2025-01-09 PROBLEM — R69 DISORDER: Status: ACTIVE | Noted: 2025-01-09

## 2025-01-09 PROCEDURE — 99213 OFFICE O/P EST LOW 20 MIN: CPT | Performed by: INTERNAL MEDICINE

## 2025-01-09 PROCEDURE — 1124F ACP DISCUSS-NO DSCNMKR DOCD: CPT | Performed by: INTERNAL MEDICINE

## 2025-01-09 PROCEDURE — 3078F DIAST BP <80 MM HG: CPT | Performed by: INTERNAL MEDICINE

## 2025-01-09 PROCEDURE — 3075F SYST BP GE 130 - 139MM HG: CPT | Performed by: INTERNAL MEDICINE

## 2025-01-09 PROCEDURE — 1159F MED LIST DOCD IN RCRD: CPT | Performed by: INTERNAL MEDICINE

## 2025-01-09 RX ORDER — ATORVASTATIN CALCIUM 20 MG/1
20 TABLET, FILM COATED ORAL DAILY
Qty: 90 TABLET | Refills: 1 | Status: SHIPPED | OUTPATIENT
Start: 2025-01-09 | End: 2026-01-09

## 2025-01-09 RX ORDER — TAMSULOSIN HYDROCHLORIDE 0.4 MG/1
0.8 CAPSULE ORAL DAILY
Qty: 180 CAPSULE | Refills: 1 | Status: SHIPPED | OUTPATIENT
Start: 2025-01-09

## 2025-01-09 RX ORDER — NEBIVOLOL 10 MG/1
10 TABLET ORAL DAILY
Qty: 90 TABLET | Refills: 1 | Status: SHIPPED | OUTPATIENT
Start: 2025-01-09

## 2025-01-09 RX ORDER — OLMESARTAN MEDOXOMIL AND HYDROCHLOROTHIAZIDE 40/25 40; 25 MG/1; MG/1
1 TABLET ORAL DAILY
Qty: 90 TABLET | Refills: 1 | Status: SHIPPED | OUTPATIENT
Start: 2025-01-09

## 2025-01-09 RX ORDER — ALLOPURINOL 300 MG/1
300 TABLET ORAL DAILY
Qty: 90 TABLET | Refills: 1 | Status: SHIPPED | OUTPATIENT
Start: 2025-01-09

## 2025-01-09 RX ORDER — FINASTERIDE 5 MG/1
5 TABLET, FILM COATED ORAL DAILY
Qty: 90 TABLET | Refills: 1 | Status: SHIPPED | OUTPATIENT
Start: 2025-01-09

## 2025-01-10 NOTE — PROGRESS NOTES
"Subjective   Patient ID: Geovany Stover is a 79 y.o. male who presents for Follow-up and Results.     Geovany Stover today came here for multiple medical issues.  Overall, he is a happy person.  No chest pain.  He is a winter bird, he is off to [place].  He came for follow-up on various conditions.  Appetite and weight are okay.  No problem.    I have personally reviewed the patient's Past Medical History, Medications, Allergies, Social History, and Family History in the EMR.    Review of Systems   All other systems reviewed and are negative.    Objective   /70   Ht 1.702 m (5' 7\")   Wt 84.8 kg (187 lb)   BMI 29.29 kg/m²     Physical Exam  Vitals reviewed.   Cardiovascular:      Heart sounds: Normal heart sounds, S1 normal and S2 normal. No murmur heard.     No friction rub.   Pulmonary:      Effort: Pulmonary effort is normal.      Breath sounds: Normal breath sounds and air entry.   Abdominal:      Palpations: There is no hepatomegaly, splenomegaly or mass.   Musculoskeletal:      Right lower leg: No edema.      Left lower leg: No edema.   Lymphadenopathy:      Lower Body: No right inguinal adenopathy. No left inguinal adenopathy.   Neurological:      Cranial Nerves: Cranial nerves 2-12 are intact.      Sensory: No sensory deficit.      Motor: Motor function is intact.      Deep Tendon Reflexes: Reflexes are normal and symmetric.     LAB WORK: Laboratory testing discussed.    Assessment/Plan   Problem List Items Addressed This Visit             ICD-10-CM       Cardiac and Vasculature    Hypertension I10    Relevant Medications    olmesartan-hydrochlorothiazide (BENIcar HCT) 40-25 mg tablet    nebivolol (Bystolic) 10 mg tablet    Other Relevant Orders    CBC    Urinalysis with Reflex Microscopic    Thyroid Stimulating Hormone    Pure hypercholesterolemia E78.00    Relevant Medications    atorvastatin (Lipitor) 20 mg tablet    Other Relevant Orders    Comprehensive Metabolic Panel    Lipid Panel       " Genitourinary and Reproductive    Prostatitis N41.9    Relevant Medications    tamsulosin (Flomax) 0.4 mg 24 hr capsule    finasteride (Proscar) 5 mg tablet       Musculoskeletal and Injuries    Gout M10.9    Relevant Medications    allopurinol (Zyloprim) 300 mg tablet     Other Visit Diagnoses         Codes    Benign prostatic hyperplasia, unspecified whether lower urinary tract symptoms present    -  Primary N40.0        1. Hypertension.  Kidney okay.  2. Cholesterol.  Monitor.  3. Gout, stable.  4. BPH, on medication.  5. Follow-up appointment with me when he comes back from [place].  Happy to see him anytime sooner if necessary.    Scribe Attestation  By signing my name below, ICarolin Scribe attest that this documentation has been prepared under the direction and in the presence of Hao Meng MD.     All medical record entries made by the barberibvineet were personally dictated by me I have reviewed the chart and agree the record accurately reflects my personal performance of his history physical examination and management

## 2025-02-04 ENCOUNTER — COMPREHENSIVE EXAM (OUTPATIENT)
Age: 80
End: 2025-02-04

## 2025-02-04 DIAGNOSIS — H26.493: ICD-10-CM

## 2025-02-04 DIAGNOSIS — H43.813: ICD-10-CM

## 2025-02-04 DIAGNOSIS — H35.371: ICD-10-CM

## 2025-02-04 DIAGNOSIS — H35.363: ICD-10-CM

## 2025-02-04 PROCEDURE — 99214 OFFICE O/P EST MOD 30 MIN: CPT

## 2025-02-04 PROCEDURE — 92134 CPTRZ OPH DX IMG PST SGM RTA: CPT

## 2025-02-24 ENCOUNTER — SURGERY/PROCEDURE (OUTPATIENT)
Age: 80
End: 2025-02-24

## 2025-02-24 DIAGNOSIS — H26.491: ICD-10-CM

## 2025-02-24 PROCEDURE — 66821 AFTER CATARACT LASER SURGERY: CPT

## 2025-03-07 ENCOUNTER — POST-OP (OUTPATIENT)
Age: 80
End: 2025-03-07

## 2025-03-07 DIAGNOSIS — Z98.890: ICD-10-CM

## 2025-03-07 PROCEDURE — 99024 POSTOP FOLLOW-UP VISIT: CPT

## 2025-05-21 ENCOUNTER — APPOINTMENT (OUTPATIENT)
Dept: CARDIOLOGY | Facility: CLINIC | Age: 80
End: 2025-05-21
Payer: MEDICARE

## 2025-06-01 ASSESSMENT — ENCOUNTER SYMPTOMS
NEUROLOGICAL NEGATIVE: 1
CONSTITUTIONAL NEGATIVE: 1
RESPIRATORY NEGATIVE: 1
CARDIOVASCULAR NEGATIVE: 1
GASTROINTESTINAL NEGATIVE: 1
MUSCULOSKELETAL NEGATIVE: 1

## 2025-06-01 NOTE — PROGRESS NOTES
Chief Complaint:   No chief complaint on file.    History Of Present Illness:    .Mr Stover returns in follow up.  Denies chest pain, sob, palpitations or pedal edema.         Last Recorded Vitals:  There were no vitals taken for this visit.     Past Medical History:  Past Medical History:   Diagnosis Date    Arthritis     Gout     High cholesterol     Hypertension     Personal history of other diseases of the nervous system and sense organs     History of cataract    Personal history of other endocrine, nutritional and metabolic disease 06/21/2013    History of hypercalcemia        Past Surgical History:  No past surgical history on file.    Social History:  Social History     Socioeconomic History    Marital status:     Number of children: 2   Occupational History    Occupation: Retired businessman, but still works.   Tobacco Use    Smoking status: Former     Types: Cigarettes    Smokeless tobacco: Never   Substance and Sexual Activity    Alcohol use: Never    Drug use: Never    Sexual activity: Yes     Partners: Female       Family History:  Family History   Problem Relation Name Age of Onset    Cancer Mother Marguerite Stover     Heart attack Father Madhu Stover     Cancer Brother tammi Stover          Allergies:  Patient has no known allergies.    Outpatient Medications:  Current Outpatient Medications   Medication Sig Dispense Refill    allopurinol (Zyloprim) 300 mg tablet Take 1 tablet (300 mg) by mouth once daily. 90 tablet 1    atorvastatin (Lipitor) 20 mg tablet Take 1 tablet (20 mg) by mouth once daily. 90 tablet 1    finasteride (Proscar) 5 mg tablet Take 1 tablet (5 mg) by mouth once daily. 90 tablet 1    nebivolol (Bystolic) 10 mg tablet Take 1 tablet (10 mg) by mouth once daily. 90 tablet 1    olmesartan-hydrochlorothiazide (BENIcar HCT) 40-25 mg tablet Take 1 tablet by mouth once daily. 90 tablet 1    sildenafil (Viagra) 100 mg tablet Take 1 tablet (100 mg) by mouth if needed for erectile  dysfunction. 10 tablet 0    tamsulosin (Flomax) 0.4 mg 24 hr capsule Take 2 capsules (0.8 mg) by mouth once daily. 180 capsule 1     No current facility-administered medications for this visit.        Physical Exam:  Cardiovascular:      PMI at left midclavicular line. Normal rate. Regular rhythm. Normal S1. Normal S2.       Murmurs: There is no murmur.      No gallop.  No click. No rub.   Pulses:     Intact distal pulses.   Edema:     Peripheral edema absent.       ROS:  Review of Systems   Constitutional: Negative.   Cardiovascular: Negative.    Respiratory: Negative.     Skin: Negative.    Musculoskeletal: Negative.    Gastrointestinal: Negative.    Genitourinary: Negative.    Neurological: Negative.           Last Labs:  CBC -  Lab Results   Component Value Date    WBC 6.8 01/08/2025    HGB 13.8 01/08/2025    HCT 43.7 01/08/2025    MCV 94 01/08/2025     01/08/2025       CMP -  Lab Results   Component Value Date    CALCIUM 9.6 01/08/2025    PROT 6.7 01/08/2025    ALBUMIN 4.2 01/08/2025    AST 24 01/08/2025    ALT 19 01/08/2025    ALKPHOS 65 01/08/2025    BILITOT 0.9 01/08/2025       LIPID PANEL -   Lab Results   Component Value Date    CHOL 136 01/08/2025    TRIG 111 01/08/2025    HDL 45.5 01/08/2025    CHHDL 3.0 01/08/2025    LDLF 48 08/08/2022    VLDL 22 01/08/2025    NHDL 91 01/08/2025       RENAL FUNCTION PANEL -   Lab Results   Component Value Date    GLUCOSE 110 (H) 01/08/2025     01/08/2025    K 3.7 01/08/2025     01/08/2025    CO2 26 01/08/2025    ANIONGAP 11 01/08/2025    BUN 27 (H) 01/08/2025    CREATININE 1.10 01/08/2025    GFRMALE 76 08/08/2022    CALCIUM 9.6 01/08/2025    ALBUMIN 4.2 01/08/2025        Lab Results   Component Value Date    HGBA1C 5.5 06/19/2019         Assessment/Plan   Problem List Items Addressed This Visit    None  Assessment:    1. CAD. Patient had coronary artery calcium score of 38.94 when done December 2021. Had an echo showing an EF of 65%.  Patient returned  from [place] in early 5/2025 and had an uneventful winter.  He only had 1 minor episode of atypical chest discomfort relieved by belching.  Patient remains asymptomatic.     2. Hyperlipidemia.  Lab work 11/18/2023 includes cholesterol 139 LDL 73 HDL 39.  Will increase atorvastatin from 10 to 20 mg daily.  Repeat lab work from 1/8/2025 with improved lipid panel cholesterol 136 LDL 68 HDL 45 triglyceride 111.  His CBC and CMP were normal with TSH 1.17 uric acid 5.5.     3. Hypertension. Continue current medications.  Lab work 8/29/2024 includes a normal CBC serum iron 65 B12 359 uric acid 8.8.     4. BPH.  PSA value was 2.60 on 1/5/2024.     5. Gout.  Recent uric acid elevated at 8.8 on 8/29/2024.  Patient now on allopurinol 300 mg daily.  Patient's uric acid improved to 5.5 on 1/8/2025 with allopurinol.

## 2025-06-04 ENCOUNTER — OFFICE VISIT (OUTPATIENT)
Dept: CARDIOLOGY | Facility: CLINIC | Age: 80
End: 2025-06-04
Payer: MEDICARE

## 2025-06-04 VITALS
BODY MASS INDEX: 29.76 KG/M2 | SYSTOLIC BLOOD PRESSURE: 116 MMHG | DIASTOLIC BLOOD PRESSURE: 64 MMHG | OXYGEN SATURATION: 97 % | WEIGHT: 190 LBS | HEART RATE: 64 BPM

## 2025-06-04 DIAGNOSIS — I10 HYPERTENSION, UNSPECIFIED TYPE: Primary | ICD-10-CM

## 2025-06-04 DIAGNOSIS — E78.00 PURE HYPERCHOLESTEROLEMIA: ICD-10-CM

## 2025-06-04 PROCEDURE — 99214 OFFICE O/P EST MOD 30 MIN: CPT | Performed by: INTERNAL MEDICINE

## 2025-06-04 PROCEDURE — 99212 OFFICE O/P EST SF 10 MIN: CPT | Performed by: INTERNAL MEDICINE

## 2025-06-04 PROCEDURE — G2211 COMPLEX E/M VISIT ADD ON: HCPCS | Performed by: INTERNAL MEDICINE

## 2025-06-04 PROCEDURE — 1160F RVW MEDS BY RX/DR IN RCRD: CPT | Performed by: INTERNAL MEDICINE

## 2025-06-04 PROCEDURE — 3074F SYST BP LT 130 MM HG: CPT | Performed by: INTERNAL MEDICINE

## 2025-06-04 PROCEDURE — 1159F MED LIST DOCD IN RCRD: CPT | Performed by: INTERNAL MEDICINE

## 2025-06-04 PROCEDURE — 3078F DIAST BP <80 MM HG: CPT | Performed by: INTERNAL MEDICINE

## 2025-08-15 DIAGNOSIS — E78.00 PURE HYPERCHOLESTEROLEMIA: Primary | ICD-10-CM

## 2025-08-15 RX ORDER — ATORVASTATIN CALCIUM 20 MG/1
20 TABLET, FILM COATED ORAL DAILY
Qty: 90 TABLET | Refills: 3 | Status: SHIPPED | OUTPATIENT
Start: 2025-08-15 | End: 2026-08-15